# Patient Record
Sex: MALE | Race: WHITE | NOT HISPANIC OR LATINO | Employment: OTHER | URBAN - METROPOLITAN AREA
[De-identification: names, ages, dates, MRNs, and addresses within clinical notes are randomized per-mention and may not be internally consistent; named-entity substitution may affect disease eponyms.]

---

## 2023-09-11 ENCOUNTER — OFFICE VISIT (OUTPATIENT)
Dept: FAMILY MEDICINE CLINIC | Facility: CLINIC | Age: 68
End: 2023-09-11
Payer: MEDICARE

## 2023-09-11 VITALS
BODY MASS INDEX: 35.33 KG/M2 | WEIGHT: 260.8 LBS | HEIGHT: 72 IN | DIASTOLIC BLOOD PRESSURE: 80 MMHG | SYSTOLIC BLOOD PRESSURE: 138 MMHG

## 2023-09-11 DIAGNOSIS — Z12.5 SCREENING PSA (PROSTATE SPECIFIC ANTIGEN): ICD-10-CM

## 2023-09-11 DIAGNOSIS — Z13.0 SCREENING FOR DEFICIENCY ANEMIA: ICD-10-CM

## 2023-09-11 DIAGNOSIS — Z13.29 SCREENING FOR ENDOCRINE DISORDER: ICD-10-CM

## 2023-09-11 DIAGNOSIS — I10 PRIMARY HYPERTENSION: Primary | ICD-10-CM

## 2023-09-11 DIAGNOSIS — M54.41 ACUTE RIGHT-SIDED LOW BACK PAIN WITH RIGHT-SIDED SCIATICA: ICD-10-CM

## 2023-09-11 DIAGNOSIS — N52.9 ERECTILE DYSFUNCTION, UNSPECIFIED ERECTILE DYSFUNCTION TYPE: ICD-10-CM

## 2023-09-11 DIAGNOSIS — F10.90 HEAVY ALCOHOL USE: ICD-10-CM

## 2023-09-11 DIAGNOSIS — Z13.220 SCREENING FOR HYPERLIPIDEMIA: ICD-10-CM

## 2023-09-11 RX ORDER — SILDENAFIL 100 MG/1
100 TABLET, FILM COATED ORAL DAILY PRN
Qty: 15 TABLET | Refills: 0 | Status: SHIPPED | OUTPATIENT
Start: 2023-09-11

## 2023-09-11 RX ORDER — MULTIPLE VITAMINS W/ MINERALS TAB 9MG-400MCG
1 TAB ORAL DAILY
COMMUNITY

## 2023-09-11 NOTE — PROGRESS NOTES
New Patient Office Visit      Patient Name: Blair Rodriguez  : 1955   MRN: [id number]     Chief Complaint:    Chief Complaint   Patient presents with    Back Pain    Leg Pain     New pt wants to discuss sciatic pain    Erectile Dysfunction       Subjective   History of Present Illness    History of Present Illness: Blair Rodriguez is a 68 y.o. male who is here today to establish care.    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 Usually gout flares in feet. Has reduced alcohol intake.  HLD: Previously prescribed atorvastatin, had fatigue and switched away (has felt better since then). Prescribed Zetia 10mg, did not start.  History of depression: Previously on depression medication, stopped a few years ago    Other physicians currently involved in patient's care:  Patient Care Team:  Elbert Ashton DO as PCP - General (Family Medicine)    Acute Concerns:  ~6 months of erectile dysfunction. Unsure if stress-related from moves. Goes to gym 3 days per week, trying to get things back to normal.    Right sided sciatica. Went to massage therapist, they suggested PT.    This patient is accompanied by their self who contributes to the history of their care.    The following portions of the patient's history were reviewed and updated as appropriate: allergies, current medications, past family history, past medical history, past social history, past surgical history and problem list.    Subjective      Review of Systems:   Review of Systems - See HPI and new patient paperwork scanned into chart    Past Medical History:   Past Medical History:   Diagnosis Date    Hypertension     Kidney stone     Low back pain 6 months       Past Surgical History:    Past Surgical History:   Procedure Laterality Date    COLONOSCOPY  4 years    FOOT SURGERY Left 1969       Family History:   Family History   Problem Relation Age of Onset    Alcohol abuse Mother     Cancer Mother        Social History:   Social History     Socioeconomic History    Marital status:    Tobacco Use    Smoking status: Former     Packs/day: 1.00     Years: 15.00     Pack years: 15.00     Types: Cigarettes     Start date: 1975     Quit date: 7/3/1986     Years since quittin.2    Smokeless tobacco: Current     Types: Chew    Tobacco comments:     Still use smokeless tobacco   Vaping Use    Vaping Use: Never used   Substance and Sexual Activity    Alcohol use: Yes     Alcohol/week: 28.0 standard drinks     Types: 28 Drinks containing 0.5 oz of alcohol per week    Drug use: Not Currently     Types: Cocaine(coke), Marijuana    Sexual activity: Yes     Partners: Female       Tobacco History:   Social History     Tobacco Use   Smoking Status Former    Packs/day: 1.00    Years: 15.00    Pack years: 15.00    Types: Cigarettes    Start date: 1975    Quit date: 7/3/1986    Years since quittin.2   Smokeless Tobacco Current    Types: Chew   Tobacco Comments    Still use smokeless tobacco       Medications:     Current Outpatient Medications:     amLODIPine (NORVASC) 5 MG tablet, Take 1 tablet by mouth Daily., Disp: , Rfl:     Glucos-Chond-MSM-Bor-D3-Hyalur (MOVE FREE JOINT HEALTH ADV + D PO), Take 1 tablet/day by mouth Daily., Disp: , Rfl:     Misc Natural Products (TART CHERRY ADVANCED PO), Take 1 tablet by mouth Daily As Needed., Disp: , Rfl:     multivitamin with minerals (One-A-Day Proactive 65+) tablet tablet, Take 1 tablet by mouth Daily., Disp: , Rfl:     olmesartan-hydrochlorothiazide (BENICAR HCT) 40-12.5 MG per tablet, Take 1 tablet by mouth Daily., Disp: , Rfl:     sildenafil (Viagra) 100 MG tablet, Take 1 tablet by mouth Daily As Needed for Erectile Dysfunction., Disp: 15  "tablet, Rfl: 0    Allergies:   No Known Allergies    Objective   Objective     Physical Exam:  Vital Signs:   Vitals:    09/11/23 1257   BP: 138/80   BP Location: Left arm   Patient Position: Sitting   Cuff Size: Adult   Weight: 118 kg (260 lb 12.8 oz)   Height: 182.9 cm (72\")     Body mass index is 35.37 kg/m².     Physical Exam  Nursing note reviewed  Const: NAD, A&Ox4, Pleasant, Cooperative  Eyes: EOMI, no conjunctivitis  ENT: No nasal discharge present, neck supple  Cardiac: Regular rate and rhythm, no cyanosis  Resp: Respiratory rate within normal limits, no increased work of breathing, no audible wheezing or retractions noted  GI: No distention or ascites  MSK: Motor and sensation grossly intact in bilateral upper extremities  Neurologic: CN II-XII grossly intact  Psych: Appropriate mood and behavior.  Skin: Warm, dry  Procedures/Radiology     Procedures  No radiology results for the last 7 days     Assessment & Plan   Assessment / Plan      Assessment/Plan:   Problems Addressed This Visit  Diagnoses and all orders for this visit:    1. Primary hypertension (Primary)  Assessment & Plan:  A little high today, 138/80. Consider magnesium, continue gym and weight loss. Alcohol cessation will also make a big difference.      2. Acute right-sided low back pain with right-sided sciatica  -     Ambulatory Referral to Physical Therapy Evaluate and treat    3. Screening for endocrine disorder  -     TSH Rfx On Abnormal To Free T4; Future  -     Comprehensive Metabolic Panel; Future    4. Screening for hyperlipidemia  -     Lipid Panel; Future    5. Screening for deficiency anemia  -     CBC & Differential; Future    6. Erectile dysfunction, unspecified erectile dysfunction type  -     sildenafil (Viagra) 100 MG tablet; Take 1 tablet by mouth Daily As Needed for Erectile Dysfunction.  Dispense: 15 tablet; Refill: 0  -     Testosterone; Future  -     Prolactin; Future  -     Urinalysis With Microscopic If Indicated (No " Culture) - Urine, Clean Catch; Future  -     Vitamin B12; Future    7. Screening PSA (prostate specific antigen)  -     PSA Screen; Future    8. Heavy alcohol use      Problem List Items Addressed This Visit          Cardiac and Vasculature    Primary hypertension - Primary    Overview     Olmesartan-HCTZ 40-12.5mg, amlodipine 5mg         Current Assessment & Plan     A little high today, 138/80. Consider magnesium, continue gym and weight loss. Alcohol cessation will also make a big difference.            Mental Health    Heavy alcohol use    Overview     5-7 drinks per night, vodka          Other Visit Diagnoses       Acute right-sided low back pain with right-sided sciatica        Relevant Orders    Ambulatory Referral to Physical Therapy Evaluate and treat    Screening for endocrine disorder        Relevant Orders    TSH Rfx On Abnormal To Free T4 (Completed)    Comprehensive Metabolic Panel (Completed)    Screening for hyperlipidemia        Relevant Orders    Lipid Panel (Completed)    Screening for deficiency anemia        Relevant Orders    CBC & Differential (Completed)    Erectile dysfunction, unspecified erectile dysfunction type        Relevant Medications    sildenafil (Viagra) 100 MG tablet    Other Relevant Orders    Testosterone (Completed)    Prolactin (Completed)    Urinalysis With Microscopic If Indicated (No Culture) - Urine, Clean Catch (Completed)    Vitamin B12 (Completed)    Screening PSA (prostate specific antigen)        Relevant Orders    PSA Screen (Completed)            We will plan to obtain previous records both for chronic preventative care as well as those related to the current episode of care.  Any records that the patient brought with him today were reviewed personally by me during the visit today and will be scanned into the chart for posterity.    Discussed the nature of the disease including relevant anatomy & expected clinical course, risks, complications, implications,  management, safe and proper use of medications. Plan of care reviewed with patient at the conclusion of today's visit. Education was provided regarding diagnosis and management.  Patient verbalizes understanding of and agreement with management plan. Encouraged therapeutic lifestyle changes including low calorie diet with plenty of fruits and vegetables, daily exercise, medication compliance, and keeping scheduled follow up appointments with me and any other providers. Encouraged patient to have appointment for complete physical, fasting labs, appropriate screenings, and immunizations on an annual basis. Discussed extended office hours, shared call, and appropriate use of the ER. Discussed generally we do not prescribe chronic controlled substances from this office. Appropriate referrals will be made to pain management and psychiatry if needed. Stressed the importance and expectation of medical compliance with plan of care, medications, and follow up appointments.    Patient Instructions   I would like you to have your labs done about a week prior to your next appointment.  You do not need an appointment, but I would advise to call our office a few days before you plan to have your labs done to confirm that orders are in and active.  We will discuss the results at your next scheduled visit.  -Lab services are available at any [facility] outpatient lab center, including across the street at [address] [place] [address] Dr     Use [employer] coupon (online or in nancy) for Viagra.    Try to bring down to 203 drinks per night    Follow Up:   Return in about 6 weeks (around 10/23/2023) for Medicare Wellness.      DO JACKY Powell RD  [facility]  [address]  [facility] [id number]  Fax [phone]  Phone [phone]

## 2023-09-11 NOTE — ASSESSMENT & PLAN NOTE
A little high today, 138/80. Consider magnesium, continue gym and weight loss. Alcohol cessation will also make a big difference.

## 2023-09-11 NOTE — PATIENT INSTRUCTIONS
I would like you to have your labs done about a week prior to your next appointment.  You do not need an appointment, but I would advise to call our office a few days before you plan to have your labs done to confirm that orders are in and active.  We will discuss the results at your next scheduled visit.  -Lab services are available at any [facility] outpatient lab center, including across the street at [address] [place] [address] Dr     Use [employer] coupon (online or in nancy) for Viagra.    Try to bring down to 203 drinks per night

## 2023-09-12 ENCOUNTER — LAB (OUTPATIENT)
Dept: LAB | Facility: HOSPITAL | Age: 68
End: 2023-09-12
Payer: MEDICARE

## 2023-09-12 DIAGNOSIS — N52.9 ERECTILE DYSFUNCTION, UNSPECIFIED ERECTILE DYSFUNCTION TYPE: ICD-10-CM

## 2023-09-12 DIAGNOSIS — Z13.220 SCREENING FOR HYPERLIPIDEMIA: ICD-10-CM

## 2023-09-12 DIAGNOSIS — Z13.0 SCREENING FOR DEFICIENCY ANEMIA: ICD-10-CM

## 2023-09-12 DIAGNOSIS — Z13.29 SCREENING FOR ENDOCRINE DISORDER: ICD-10-CM

## 2023-09-12 DIAGNOSIS — Z12.5 SCREENING PSA (PROSTATE SPECIFIC ANTIGEN): ICD-10-CM

## 2023-09-12 LAB
ALBUMIN SERPL-MCNC: 4.5 G/DL (ref 3.5–5.2)
ALBUMIN/GLOB SERPL: 1.7 G/DL
ALP SERPL-CCNC: 78 U/L (ref 39–117)
ALT SERPL W P-5'-P-CCNC: 115 U/L (ref 1–41)
ANION GAP SERPL CALCULATED.3IONS-SCNC: 15.3 MMOL/L (ref 5–15)
AST SERPL-CCNC: 100 U/L (ref 1–40)
BACTERIA UR QL AUTO: ABNORMAL /HPF
BASOPHILS # BLD AUTO: 0.05 10*3/MM3 (ref 0–0.2)
BASOPHILS NFR BLD AUTO: 0.8 % (ref 0–1.5)
BILIRUB SERPL-MCNC: 1 MG/DL (ref 0–1.2)
BILIRUB UR QL STRIP: NEGATIVE
BUN SERPL-MCNC: 8 MG/DL (ref 8–23)
BUN/CREAT SERPL: 8.3 (ref 7–25)
CALCIUM SPEC-SCNC: 9.8 MG/DL (ref 8.6–10.5)
CHLORIDE SERPL-SCNC: 102 MMOL/L (ref 98–107)
CHOLEST SERPL-MCNC: 252 MG/DL (ref 0–200)
CLARITY UR: ABNORMAL
CO2 SERPL-SCNC: 26.7 MMOL/L (ref 22–29)
COLOR UR: ABNORMAL
CREAT SERPL-MCNC: 0.96 MG/DL (ref 0.76–1.27)
DEPRECATED RDW RBC AUTO: 40.6 FL (ref 37–54)
EGFRCR SERPLBLD CKD-EPI 2021: 86.6 ML/MIN/1.73
EOSINOPHIL # BLD AUTO: 0.23 10*3/MM3 (ref 0–0.4)
EOSINOPHIL NFR BLD AUTO: 3.7 % (ref 0.3–6.2)
ERYTHROCYTE [DISTWIDTH] IN BLOOD BY AUTOMATED COUNT: 11.8 % (ref 12.3–15.4)
GLOBULIN UR ELPH-MCNC: 2.6 GM/DL
GLUCOSE SERPL-MCNC: 120 MG/DL (ref 65–99)
GLUCOSE UR STRIP-MCNC: NEGATIVE MG/DL
HCT VFR BLD AUTO: 49.9 % (ref 37.5–51)
HDLC SERPL-MCNC: 44 MG/DL (ref 40–60)
HGB BLD-MCNC: 17.4 G/DL (ref 13–17.7)
HGB UR QL STRIP.AUTO: ABNORMAL
HYALINE CASTS UR QL AUTO: ABNORMAL /LPF
IMM GRANULOCYTES # BLD AUTO: 0.02 10*3/MM3 (ref 0–0.05)
IMM GRANULOCYTES NFR BLD AUTO: 0.3 % (ref 0–0.5)
KETONES UR QL STRIP: ABNORMAL
LDLC SERPL CALC-MCNC: 174 MG/DL (ref 0–100)
LDLC/HDLC SERPL: 3.9 {RATIO}
LEUKOCYTE ESTERASE UR QL STRIP.AUTO: ABNORMAL
LYMPHOCYTES # BLD AUTO: 2.83 10*3/MM3 (ref 0.7–3.1)
LYMPHOCYTES NFR BLD AUTO: 45.9 % (ref 19.6–45.3)
MCH RBC QN AUTO: 32.8 PG (ref 26.6–33)
MCHC RBC AUTO-ENTMCNC: 34.9 G/DL (ref 31.5–35.7)
MCV RBC AUTO: 94 FL (ref 79–97)
MONOCYTES # BLD AUTO: 0.61 10*3/MM3 (ref 0.1–0.9)
MONOCYTES NFR BLD AUTO: 9.9 % (ref 5–12)
NEUTROPHILS NFR BLD AUTO: 2.42 10*3/MM3 (ref 1.7–7)
NEUTROPHILS NFR BLD AUTO: 39.4 % (ref 42.7–76)
NITRITE UR QL STRIP: NEGATIVE
NRBC BLD AUTO-RTO: 0 /100 WBC (ref 0–0.2)
PH UR STRIP.AUTO: 6 [PH] (ref 5–8)
PLATELET # BLD AUTO: 85 10*3/MM3 (ref 140–450)
PMV BLD AUTO: 10.7 FL (ref 6–12)
POTASSIUM SERPL-SCNC: 4 MMOL/L (ref 3.5–5.2)
PROLACTIN SERPL-MCNC: 13.5 NG/ML (ref 4.04–15.2)
PROT SERPL-MCNC: 7.1 G/DL (ref 6–8.5)
PROT UR QL STRIP: ABNORMAL
PSA SERPL-MCNC: 0.94 NG/ML (ref 0–4)
RBC # BLD AUTO: 5.31 10*6/MM3 (ref 4.14–5.8)
RBC # UR STRIP: ABNORMAL /HPF
REF LAB TEST METHOD: ABNORMAL
SODIUM SERPL-SCNC: 144 MMOL/L (ref 136–145)
SP GR UR STRIP: 1.02 (ref 1–1.03)
SQUAMOUS #/AREA URNS HPF: ABNORMAL /HPF
TESTOST SERPL-MCNC: 421 NG/DL (ref 193–740)
TRIGL SERPL-MCNC: 181 MG/DL (ref 0–150)
TSH SERPL DL<=0.05 MIU/L-ACNC: 2.2 UIU/ML (ref 0.27–4.2)
UROBILINOGEN UR QL STRIP: ABNORMAL
VIT B12 BLD-MCNC: 563 PG/ML (ref 211–946)
VLDLC SERPL-MCNC: 34 MG/DL (ref 5–40)
WBC # UR STRIP: ABNORMAL /HPF
WBC NRBC COR # BLD: 6.16 10*3/MM3 (ref 3.4–10.8)

## 2023-09-12 PROCEDURE — 80053 COMPREHEN METABOLIC PANEL: CPT

## 2023-09-12 PROCEDURE — 84403 ASSAY OF TOTAL TESTOSTERONE: CPT

## 2023-09-12 PROCEDURE — 84443 ASSAY THYROID STIM HORMONE: CPT

## 2023-09-12 PROCEDURE — G0103 PSA SCREENING: HCPCS

## 2023-09-12 PROCEDURE — 81001 URINALYSIS AUTO W/SCOPE: CPT

## 2023-09-12 PROCEDURE — 82607 VITAMIN B-12: CPT

## 2023-09-12 PROCEDURE — 80061 LIPID PANEL: CPT

## 2023-09-12 PROCEDURE — 84146 ASSAY OF PROLACTIN: CPT

## 2023-09-12 PROCEDURE — 85025 COMPLETE CBC W/AUTO DIFF WBC: CPT

## 2023-09-14 ENCOUNTER — PATIENT ROUNDING (BHMG ONLY) (OUTPATIENT)
Dept: FAMILY MEDICINE CLINIC | Facility: CLINIC | Age: 68
End: 2023-09-14
Payer: MEDICARE

## 2023-10-10 ENCOUNTER — TELEPHONE (OUTPATIENT)
Dept: FAMILY MEDICINE CLINIC | Facility: CLINIC | Age: 68
End: 2023-10-10
Payer: MEDICARE

## 2023-10-10 NOTE — TELEPHONE ENCOUNTER
Caller: Blair Rodriguez    Relationship: Self    Best call back number: [phone]     What medication are you requesting: PATIENT IS GOING HUNTING IN [place].  THERE IS A MEDICATION THAT HELPS ACCLIMATE BREATHING ISSUES.  HE WOULD LIKE THIS MEDICATION.      If a prescription is needed, what is your preferred pharmacy and phone number:  [facility]     Additional notes:

## 2023-10-11 NOTE — TELEPHONE ENCOUNTER
Patient stated he is not leaving until after his scheduled appointment with Dr. Ashton at the end of the month. He will discuss it at that time.

## 2023-10-24 ENCOUNTER — LAB (OUTPATIENT)
Dept: LAB | Facility: HOSPITAL | Age: 68
End: 2023-10-24
Payer: MEDICARE

## 2023-10-24 ENCOUNTER — OFFICE VISIT (OUTPATIENT)
Dept: FAMILY MEDICINE CLINIC | Facility: CLINIC | Age: 68
End: 2023-10-24
Payer: MEDICARE

## 2023-10-24 VITALS
WEIGHT: 262 LBS | BODY MASS INDEX: 35.49 KG/M2 | SYSTOLIC BLOOD PRESSURE: 118 MMHG | HEIGHT: 72 IN | HEART RATE: 86 BPM | OXYGEN SATURATION: 98 % | DIASTOLIC BLOOD PRESSURE: 86 MMHG

## 2023-10-24 DIAGNOSIS — M10.271 ACUTE DRUG-INDUCED GOUT OF RIGHT FOOT: ICD-10-CM

## 2023-10-24 DIAGNOSIS — F41.1 GAD (GENERALIZED ANXIETY DISORDER): ICD-10-CM

## 2023-10-24 DIAGNOSIS — F10.90 HEAVY ALCOHOL USE: ICD-10-CM

## 2023-10-24 DIAGNOSIS — Z00.00 MEDICARE ANNUAL WELLNESS VISIT, SUBSEQUENT: Primary | ICD-10-CM

## 2023-10-24 DIAGNOSIS — E78.5 HYPERLIPIDEMIA LDL GOAL <100: ICD-10-CM

## 2023-10-24 DIAGNOSIS — I10 PRIMARY HYPERTENSION: ICD-10-CM

## 2023-10-24 DIAGNOSIS — M10.9 ACUTE GOUT OF RIGHT FOOT, UNSPECIFIED CAUSE: ICD-10-CM

## 2023-10-24 DIAGNOSIS — Z29.89 ALTITUDE SICKNESS PREVENTATIVE MEASURES: ICD-10-CM

## 2023-10-24 LAB — URATE SERPL-MCNC: 10.1 MG/DL (ref 3.4–7)

## 2023-10-24 PROCEDURE — 3079F DIAST BP 80-89 MM HG: CPT | Performed by: FAMILY MEDICINE

## 2023-10-24 PROCEDURE — 3074F SYST BP LT 130 MM HG: CPT | Performed by: FAMILY MEDICINE

## 2023-10-24 PROCEDURE — G0439 PPPS, SUBSEQ VISIT: HCPCS | Performed by: FAMILY MEDICINE

## 2023-10-24 PROCEDURE — 84550 ASSAY OF BLOOD/URIC ACID: CPT

## 2023-10-24 RX ORDER — OLMESARTAN MEDOXOMIL 40 MG/1
40 TABLET ORAL DAILY
Qty: 90 TABLET | Refills: 1 | Status: SHIPPED | OUTPATIENT
Start: 2023-10-24

## 2023-10-24 RX ORDER — DIAZEPAM 2 MG/1
TABLET ORAL
Qty: 67 TABLET | Refills: 0 | Status: SHIPPED | OUTPATIENT
Start: 2023-10-24 | End: 2023-11-22

## 2023-10-24 RX ORDER — ACETAZOLAMIDE 125 MG/1
125 TABLET ORAL 2 TIMES DAILY
Qty: 2 TABLET | Refills: 0 | Status: SHIPPED | OUTPATIENT
Start: 2023-10-24 | End: 2023-10-25

## 2023-10-24 RX ORDER — PREDNISONE 20 MG/1
TABLET ORAL
Qty: 12 TABLET | Refills: 0 | Status: SHIPPED | OUTPATIENT
Start: 2023-10-24 | End: 2023-10-29

## 2023-10-24 RX ORDER — OLMESARTAN MEDOXOMIL AND HYDROCHLOROTHIAZIDE 40/12.5 40; 12.5 MG/1; MG/1
1 TABLET ORAL DAILY
Qty: 90 TABLET | Refills: 1 | Status: SHIPPED | OUTPATIENT
Start: 2023-10-24 | End: 2023-10-24

## 2023-10-24 RX ORDER — FLUOXETINE HYDROCHLORIDE 20 MG/1
20 CAPSULE ORAL DAILY
Qty: 30 CAPSULE | Refills: 2 | Status: SHIPPED | OUTPATIENT
Start: 2023-10-24

## 2023-10-24 RX ORDER — AMLODIPINE BESYLATE 5 MG/1
5 TABLET ORAL DAILY
Qty: 90 TABLET | Refills: 1 | Status: SHIPPED | OUTPATIENT
Start: 2023-10-24

## 2023-10-24 NOTE — PROGRESS NOTES
The ABCs of the Annual Wellness Visit  Subsequent Medicare Wellness Visit    Subjective      Blair Rodriguez is a 68 y.o. male who presents for a Subsequent Medicare Wellness Visit.    The following portions of the patient's history were reviewed and   updated as appropriate: allergies, current medications, past family history, past medical history, past social history, past surgical history, and problem list.    Chronic health conditions:  HTN: Amlodipine 5mg daily for years, recently added olmesartan-HCTZ 40-12.5mg  Gout: Takes tart cherry supplement 10,500mg. Usually gout flares in feet. Has reduced alcohol intake in the last few years, but still at 6 liquor shot equivalents per day. Had recent gout flares in both feet, pain has been persisting for a few weeks. Upped his tart cherry supp to 2 pills per day.  HLD: Previously prescribed atorvastatin, had fatigue and switched away (has felt better since then). Prescribed Zetia 10mg, did not start.  History of depression: Previously on depression medication, stopped a few years ago    Compared to one year ago, the patient feels his physical   health is the same.    Compared to one year ago, the patient feels his mental   health is the same.    Recent Hospitalizations:  He was not admitted to the hospital during the last year.       Current Medical Providers:  Patient Care Team:  Elbert Ashton, DO as PCP - General (Family Medicine)    Outpatient Medications Prior to Visit   Medication Sig Dispense Refill    Glucos-Chond-MSM-Bor-D3-Hyalur (MOVE FREE JOINT HEALTH ADV + D PO) Take 1 tablet/day by mouth Daily.      Misc Natural Products (TART CHERRY ADVANCED PO) Take 1 tablet by mouth Daily As Needed.      multivitamin with minerals (One-A-Day Proactive 65+) tablet tablet Take 1 tablet by mouth Daily.      sildenafil (Viagra) 100 MG tablet Take 1 tablet by mouth Daily As Needed for Erectile Dysfunction. 15 tablet 0    amLODIPine (NORVASC) 5 MG tablet Take 1 tablet by  "mouth Daily.      olmesartan-hydrochlorothiazide (BENICAR HCT) 40-12.5 MG per tablet Take 1 tablet by mouth Daily.       No facility-administered medications prior to visit.       No opioid medication identified on active medication list. I have reviewed chart for other potential  high risk medication/s and harmful drug interactions in the elderly.        Aspirin is not on active medication list.  Aspirin use is not indicated based on review of current medical condition/s. Risk of harm outweighs potential benefits.  .    Patient Active Problem List   Diagnosis    Primary hypertension    Heavy alcohol use    Hyperlipidemia LDL goal <100    UVALDO (generalized anxiety disorder)    Acute drug-induced gout of right foot     Advance Care Planning   Advance Care Planning     Advance Directive is not on file.  ACP discussion was held with the patient during this visit. Patient does not have an advance directive, information provided.     Objective    Vitals:    10/24/23 0903   BP: 118/86   BP Location: Left arm   Patient Position: Sitting   Cuff Size: Adult   Pulse: 86   SpO2: 98%   Weight: 119 kg (262 lb)   Height: 182.9 cm (72\")     Estimated body mass index is 35.53 kg/m² as calculated from the following:    Height as of this encounter: 182.9 cm (72\").    Weight as of this encounter: 119 kg (262 lb).           Does the patient have evidence of cognitive impairment?   Yes: Labs Ordered.   ATTENTION  What is the year: correct  What is the month of the year: correct  What is the day of the week?: correct  What is the date?: correct  MEMORY  Repeat address three times, only score third attempt: [address] [place], [place]: 5  HOW MANY ANIMALS DID THE PATIENT NAME  Verbal Fluency -- Animal Names (0-25): 22+  CLOCK DRAWING  Clock Drawing: All Correct  MEMORY RECALL  Tell me what you remember about that name and address we were repeating at the beginnin  ACE TOTAL SCORE  Total ACE Score - <25/30 strongly " suggests cognitive impairment; <21/30 almost certainly shows dementia: 21     Lab Results   Component Value Date    TRIG 181 (H) 2023    HDL 44 2023     (H) 2023    VLDL 34 2023          HEALTH RISK ASSESSMENT    Smoking Status:  Social History     Tobacco Use   Smoking Status Former    Packs/day: 1.00    Years: 15.00    Additional pack years: 0.00    Total pack years: 15.00    Types: Cigarettes    Start date: 1975    Quit date: 7/3/1986    Years since quittin.3   Smokeless Tobacco Current    Types: Chew   Tobacco Comments    Still use smokeless tobacco     Alcohol Consumption:  Social History     Substance and Sexual Activity   Alcohol Use Yes    Alcohol/week: 28.0 standard drinks of alcohol    Types: 28 Drinks containing 0.5 oz of alcohol per week     Fall Risk Screen:    ANTOINE Fall Risk Assessment was completed, and patient is at LOW risk for falls.Assessment completed on:10/24/2023    Depression Screening:      10/24/2023     9:10 AM   PHQ-2/PHQ-9 Depression Screening   Little Interest or Pleasure in Doing Things 0-->not at all   Feeling Down, Depressed or Hopeless 0-->not at all   PHQ-9: Brief Depression Severity Measure Score 0       Health Habits and Functional and Cognitive Screening:      10/24/2023     9:07 AM   Functional & Cognitive Status   Do you have difficulty preparing food and eating? No   Do you have difficulty bathing yourself, getting dressed or grooming yourself? No   Do you have difficulty using the toilet? No   Do you have difficulty moving around from place to place? No   Do you have trouble with steps or getting out of a bed or a chair? No   Current Diet Well Balanced Diet   Dental Exam Up to date   Eye Exam Up to date   Exercise (times per week) 3 times per week   Current Exercises Include Light Weights;Walking   Do you need help using the phone?  No   Are you deaf or do you have serious difficulty hearing?  No   Do you need help to go to places  out of walking distance? No   Do you need help shopping? No   Do you need help preparing meals?  No   Do you need help with housework?  No   Do you need help with laundry? No   Do you need help taking your medications? No   Do you need help managing money? No   Do you ever drive or ride in a car without wearing a seat belt? No   Have you felt unusual stress, anger or loneliness in the last month? No   Who do you live with? Spouse   If you need help, do you have trouble finding someone available to you? No   Have you been bothered in the last four weeks by sexual problems? No   Do you have difficulty concentrating, remembering or making decisions? No       Age-appropriate Screening Schedule:  Refer to the list below for future screening recommendations based on patient's age, sex and/or medical conditions. Orders for these recommended tests are listed in the plan section. The patient has been provided with a written plan.    Health Maintenance   Topic Date Due    ZOSTER VACCINE (2 of 3) 10/25/2016    HEPATITIS C SCREENING  Never done    Pneumococcal Vaccine 65+ (2 - PCV) 09/11/2024 (Originally 5/26/2022)    COVID-19 Vaccine (4 - 2023-24 season) 12/01/2024 (Originally 9/1/2023)    BMI FOLLOWUP  09/11/2024    LIPID PANEL  09/12/2024    ANNUAL WELLNESS VISIT  10/24/2024    COLORECTAL CANCER SCREENING  06/24/2029    TDAP/TD VACCINES (3 - Td or Tdap) 03/02/2033    INFLUENZA VACCINE  Completed    AAA SCREEN (ONE-TIME)  Completed                  CMS Preventative Services Quick Reference  Risk Factors Identified During Encounter:    None Identified    The above risks/problems have been discussed with the patient.  Pertinent information has been shared with the patient in the After Visit Summary.    Diagnoses and all orders for this visit:    1. Medicare annual wellness visit, subsequent (Primary)    2. Primary hypertension  Assessment & Plan:  Change to omesartan 40mg without HCTZ, continue amlodipine 5mg, quit alcohol.  Check uric acid.    Orders:  -     amLODIPine (NORVASC) 5 MG tablet; Take 1 tablet by mouth Daily.  Dispense: 90 tablet; Refill: 1  -     olmesartan (BENICAR) 40 MG tablet; Take 1 tablet by mouth Daily.  Dispense: 90 tablet; Refill: 1    3. Acute gout of right foot, unspecified cause  -     predniSONE (DELTASONE) 20 MG tablet; Take 3 tablets by mouth Daily for 2 days, THEN 2 tablets Daily for 2 days, THEN 1 tablet Daily for 2 days.  Dispense: 12 tablet; Refill: 0  -     Uric Acid; Future    4. Altitude sickness preventative measures  -     acetaZOLAMIDE (DIAMOX) 125 MG tablet; Take 1 tablet by mouth 2 (Two) Times a Day for 1 day. Start 24 hours before ascending to altitude.  Dispense: 2 tablet; Refill: 0    5. Hyperlipidemia LDL goal <100  Assessment & Plan:  Lab Results   Component Value Date    CHOL 252 (H) 09/12/2023    TRIG 181 (H) 09/12/2023    HDL 44 09/12/2023     (H) 09/12/2023   Recommend alcohol cessation, starting cholesterol medication in 6 months if not improved      6. UVALDO (generalized anxiety disorder)  Assessment & Plan:  Start SSRI and BZ (to assist with alcohol cessation and withdrawal)    Orders:  -     FLUoxetine (PROzac) 20 MG capsule; Take 1 capsule by mouth Daily.  Dispense: 30 capsule; Refill: 2    7. Acute drug-induced gout of right foot  Assessment & Plan:  Recommend check uric acid, alcohol cessation, stopping HCTZ      8. Heavy alcohol use  Assessment & Plan:  Recommend reduction    Orders:  -     diazePAM (VALIUM) 2 MG tablet; Take 3 tablets by mouth Daily for 7 days, THEN 2 tablets 2 (Two) Times a Day for 7 days, THEN 1 tablet 2 (Two) Times a Day for 7 days, THEN 0.5 tablets Daily for 8 days.  Dispense: 67 tablet; Refill: 0    Other orders  -     Discontinue: olmesartan-hydrochlorothiazide (BENICAR HCT) 40-12.5 MG per tablet; Take 1 tablet by mouth Daily.  Dispense: 90 tablet; Refill: 1      Problem List Items Addressed This Visit          Cardiac and Vasculature    Primary  hypertension    Current Assessment & Plan     Change to omesartan 40mg without HCTZ, continue amlodipine 5mg, quit alcohol. Check uric acid.         Relevant Medications    amLODIPine (NORVASC) 5 MG tablet    olmesartan (BENICAR) 40 MG tablet    Hyperlipidemia LDL goal <100    Current Assessment & Plan     Lab Results   Component Value Date    CHOL 252 (H) 09/12/2023    TRIG 181 (H) 09/12/2023    HDL 44 09/12/2023     (H) 09/12/2023   Recommend alcohol cessation, starting cholesterol medication in 6 months if not improved            Mental Health    Heavy alcohol use    Overview     5-7 drinks per night, vodka         Current Assessment & Plan     Recommend reduction         Relevant Medications    diazePAM (VALIUM) 2 MG tablet    UVALDO (generalized anxiety disorder)    Overview     Self-medicated for years with alcohol (6 drinks per night)         Current Assessment & Plan     Start SSRI and BZ (to assist with alcohol cessation and withdrawal)         Relevant Medications    FLUoxetine (PROzac) 20 MG capsule    diazePAM (VALIUM) 2 MG tablet       Musculoskeletal and Injuries    Acute drug-induced gout of right foot    Current Assessment & Plan     Recommend check uric acid, alcohol cessation, stopping HCTZ          Other Visit Diagnoses       Medicare annual wellness visit, subsequent    -  Primary    Acute gout of right foot, unspecified cause        Relevant Orders    Uric Acid (Completed)    Altitude sickness preventative measures              The wellness exam has been reviewed in detail.  The patient has been fully counseled on preventative guidelines for vaccines, cancer screenings, and other health maintenance needs.  Functional testing has been performed to assess capacity for independent living and need for other medical interventions.   The patient was counseled on maintaining a lifestyle to promote good health and to minimize chronic diseases.  The patient has been assisted with scheduling  healthcare procedures for the coming year and given a written document outlining these recommendations.      Follow Up:   Next Medicare Wellness visit to be scheduled in 1 year.      An After Visit Summary and PPPS were made available to the patient.

## 2023-10-24 NOTE — ASSESSMENT & PLAN NOTE
Lab Results   Component Value Date    CHOL 252 (H) 09/12/2023    TRIG 181 (H) 09/12/2023    HDL 44 09/12/2023     (H) 09/12/2023   Recommend alcohol cessation, starting cholesterol medication in 6 months if not improved

## 2023-10-24 NOTE — PATIENT INSTRUCTIONS
Advance Care Planning and Advance Directives     You make decisions on a daily basis - decisions about where you want to live, your career, your home, your life. Perhaps one of the most important decisions you face is your choice for future medical care. Take time to talk with your family and your healthcare team and start planning today.  Advance Care Planning is a process that can help you:  Understand possible future healthcare decisions in light of your own experiences  Reflect on those decision in light of your goals and values  Discuss your decisions with those closest to you and the healthcare professionals that care for you  Make a plan by creating a document that reflects your wishes    Surrogate Decision Maker  In the event of a medical emergency, which has left you unable to communicate or to make your own decisions, you would need someone to make decisions for you.  It is important to discuss your preferences for medical treatment with this person while you are in good health.     Qualities of a surrogate decision maker:  Willing to take on this role and responsibility  Knows what you want for future medical care  Willing to follow your wishes even if they don't agree with them  Able to make difficult medical decisions under stressful circumstances    Advance Directives  These are legal documents you can create that will guide your healthcare team and decision maker(s) when needed. These documents can be stored in the electronic medical record.    Living Will - a legal document to guide your care if you have a terminal condition or a serious illness and are unable to communicate. States vary by statute in document names/types, but most forms may include one or more of the following:        -  Directions regarding life-prolonging treatments        -  Directions regarding artificially provided nutrition/hydration        -  Choosing a healthcare decision maker        -  Direction regarding organ/tissue  donation    Durable Power of  for Healthcare - this document names an -in-fact to make medical decisions for you, but it may also allow this person to make personal and financial decisions for you. Please seek the advice of an  if you need this type of document.    **Advance Directives are not required and no one may discriminate against you if you do not sign one.    Medical Orders  Many states allow specific forms/orders signed by your physician to record your wishes for medical treatment in your current state of health. This form, signed in personal communication with your physician, addresses resuscitation and other medical interventions that you may or may not want.      For more information or to schedule a time with a [facility] Advance Care Planning Facilitator contact: [facility] or call [phone] and someone will contact you directly.  You are due for Shingrix vaccination series ( the newest shingles vaccine).  It is a two shot series spaced 2-6 months apart. Please get this vaccine series started at your earliest convenience at your local pharmacy to help avoid shingles outbreak. It is more effective than the old Zostavax vaccine and is recommended even if you have had the Zostavax vaccine in the past.  Once the Shingrix series is completed, it does not need to be repeated.   For more information, please look at the website below:  [facility] Shingrix Vaccine Information      Medicare Wellness  Personal Prevention Plan of Service     Date of Office Visit:    Encounter Provider:  Elbert Ashton DO  Place of Service:  [facility]  Patient Name: Blair Rodriguez  :  1955    As part of the Medicare Wellness portion of your visit today, we are providing you with this personalized preventive plan of services (PPPS). This plan is based upon recommendations of the [place] Preventive Services Task Force (USPSTF) and the Advisory  Committee on Immunization Practices (ACIP).    This lists the preventive care services that should be considered, and provides dates of when you are due. Items listed as completed are up-to-date and do not require any further intervention.    Health Maintenance   Topic Date Due   • ZOSTER VACCINE (2 of 3) 10/25/2016   • HEPATITIS C SCREENING  Never done   • Pneumococcal Vaccine 65+ (2 - PCV) 09/11/2024 (Originally 5/26/2022)   • COVID-19 Vaccine (4 - 2023-24 season) 12/01/2024 (Originally 9/1/2023)   • BMI FOLLOWUP  09/11/2024   • LIPID PANEL  09/12/2024   • ANNUAL WELLNESS VISIT  10/24/2024   • COLORECTAL CANCER SCREENING  06/24/2029   • TDAP/TD VACCINES (3 - Td or Tdap) 03/02/2033   • INFLUENZA VACCINE  Completed   • AAA SCREEN (ONE-TIME)  Completed       Orders Placed This Encounter   Procedures   • Uric Acid     Standing Status:   Future     Standing Expiration Date:   10/24/2024     Order Specific Question:   Release to patient     Answer:   Routine Release [[id number]]       Return in about 6 weeks (around 12/5/2023) for anxiety, alcohol cessation.

## 2023-10-25 ENCOUNTER — TELEPHONE (OUTPATIENT)
Dept: FAMILY MEDICINE CLINIC | Facility: CLINIC | Age: 68
End: 2023-10-25
Payer: MEDICARE

## 2023-10-25 NOTE — TELEPHONE ENCOUNTER
Caller: Blair Rodriguez    Relationship: Self    Best call back number: [phone]    What is the best time to reach you: ANYTIME     Who are you requesting to speak with (clinical staff, provider,  specific staff member): CLINICAL STAFF    What was the call regarding: PATIENT STATES THAT WHEN HE WAS SEEN HE WAS TAKEN OFF OF [facility]. HOWEVER, WHEN HE WENT TO THE PHARMACY THEY GAVE HIM A FILLED PRESCRIPTION OF THIS. HE WANTS TO MAKE SURE IF HE IS TO TAKE THIS ANYMORE.     Is it okay if the provider responds through MyChart: YES

## 2023-10-26 ENCOUNTER — PATIENT MESSAGE (OUTPATIENT)
Dept: FAMILY MEDICINE CLINIC | Facility: CLINIC | Age: 68
End: 2023-10-26
Payer: MEDICARE

## 2024-03-09 ENCOUNTER — E-VISIT (OUTPATIENT)
Dept: FAMILY MEDICINE CLINIC | Facility: TELEHEALTH | Age: 69
End: 2024-03-09

## 2024-03-09 PROCEDURE — BRIGHTMDVISIT: Performed by: NURSE PRACTITIONER

## 2024-03-09 NOTE — EXTERNAL PATIENT INSTRUCTIONS
Note   If symptoms do not improve in 5 days, follow up at urgent care or with your primary care provider.   Diagnosis   Gout, right ankle   My name is DARREL Hayden. I'm a healthcare provider at [facility]. Based on your interview, it looks like you're having a gout flare in your right ankle.   Fortunately, there are effective treatments available to manage gout flares. In the Other treatment section below, you'll find some tips to help you feel better.   Medications   Your pharmacy   [facility] [id number] [address] [facility] [address] [phone]     Prescription   Medrol (4mg): Take this medication as directed on medication packaging. Take with food.   About your diagnosis   Gout is a common form of arthritis that causes redness, pain, and swelling in the joints. It's most common in one or both of the big toes, but it can affect the ankles, elbows, feet, fingers, knees, and wrists.   Gout is caused by too much uric acid in the body. Normally, uric acid dissolves in the blood, passes through the kidneys, and is released from the body through urine. In people who have gout, uric acid forms crystals that build up in different parts of the body. Alcohol intake, poor diet, stress, and other health problems can trigger an attack.   In people already diagnosed with gout, some factors can increase the risk of repeated gout flares, such as:    Dehydration    Drinking large amounts of alcohol    Drinking beverages containing high fructose corn syrup    Eating large amounts of meat or seafood    Fasting    Injury or recent surgery    Overeating    Taking medications that cause sudden changes in blood urate levels   What to expect   If you follow this treatment plan, you should feel better within a few days. If you're not feeling better within 5 days, contact us to adjust your treatment plan.   When to seek care   Call us at [phone]   with any sudden or unexpected symptoms.   When  gout attacks become more frequent, they can last longer and affect more joints. If left untreated, gout attacks can lead to tophi. Tophi are uric acid crystals that form lumps under the skin. Tophi can form on the elbows, fingers, hands, and toes. They cause chronic pain and can destroy the bones around the joints.   Uric acid crystals that collect in the urinary tract can also lead to kidney stones or kidney disease.   Other treatment    During a flare, avoid alcohol completely.    Drink extra water to flush uric acid out of your body.    Elevate your affected joints to help reduce swelling.    Keep clothes and bedding off affected joints to avoid irritation.    Limit the amount of animal protein you eat.    Try to avoid using the affected joints, and rest in bed.   Prevention    Limit alcohol and drinks containing high fructose corn syrup.    Drink plenty of water to stay hydrated. Water helps your body get rid of uric acid.    Eat a diet low in fat, salt, and purines. Purines are compounds found in some foods. When your body digests purine, it produces uric acid. To find out more about a low-purine diet, visit the American Academy of Family Physicians (AAFP) website: https://familydoctor.org/low-purine-diet/  .    Try to maintain a healthy body weight.   Your provider   Your diagnosis was provided by DARREL Hayden, a member of your trusted care team at [facility].   If you have any questions, call us at [phone]  .

## 2024-03-09 NOTE — E-VISIT TREATED
Chief Complaint: Gout   Patient introduction   Patient is 68-year-old male with a previous gout diagnosis presenting with severe joint pain and joint swelling.   Joints affected by gout include:    Right ankle during current flare    Right ankle at initial diagnosis   Regarding an additional location of symptoms, patient writes: Left ankle .   Initial gout diagnosis was made by a primary care provider and included imaging.   General presentation   Symptoms began within the last day and came on suddenly. Including the current flare, patient has had 2 flares in the past year. Current symptoms feel similar to past gout flares. Patient is not aware of any triggers for current or past gout flares.    No recent injury or trauma to affected joints    No recent surgery, medical procedure, or dental procedure   No fever, chills, or malaise.   Patient has history of hypertension.   Medications tried for current symptoms:   The following treatments were not helpful for current symptoms:    Diclofenac    Ibuprofen   Patient's last gout flare was 3 to 6 months ago.   Medications tried for past gout flares:   The following treatments were helpful for past gout flares:    Oral steroids   Current medications   Currently taking amLODIPine 5 MG tablet and olmesartan 40 MG tablet.   Medication allergies   None.   Medication contraindication review   No history of aspirin triad, NSAID-induced asthma/urticaria, cerebral malaria, or systemic fungal infection.   Past medical history   Immune conditions: No immunocompromising conditions.   No history of cancer.   Patient-submitted comments   Patient was asked if they had anything to add about their symptoms. Patient writes: Unable to bear weight on right ankle .   Patient did not request an excuse note.   Patient chose not to upload photos of the affected joint.   Assessment   Gout, right ankle.   This is the likely diagnosis based on patient-submitted interview responses.   Plan    Medications:    Medrol (Willie) 4 mg tablets in a dose pack RX 4mg as directed PO qd 6d Take with food. Take as directed on medication packaging. Amount is 1 packet.   The patient's prescription will be sent to:   [facility] [id number]   [address] [facility] [address]   Phone: [phone]     Fax: [phone]   Education:    Condition and causes    Prevention    Treatment and self-care    When to call provider   Additional Notes   history of gout in right foot noted in patient's chart   ----------   Electronically signed by DARREL Hayden on 2024-03-09 at 10:54AM   ----------   Patient Interview Transcript:   This interview is for patients who've been diagnosed with gout and had gout flares in the past. If you haven't been diagnosed with gout in the past, you should speak with a provider to get care. Have you been diagnosed with gout? Select one.    Yes   Not selected:    No, not that I know of   Who diagnosed you with gout? Select one.    My primary care provider   Not selected:    An urgent care or emergency room provider    A convenient care (walk-in clinic) provider    A specialist, such as a rheumatologist, orthopedist, or sports medicine provider    Other (specify)   Which of these were done as part of the diagnosis? Select all that apply.    Imaging tests, such as x-ray or ultrasound   Not selected:    Blood test    Joint tap, where a needle is used to draw fluid from inside the joint    None of the above   Which of these symptoms are you having in the affected joints? Select all that apply.    Severe pain    Swelling   Not selected:    Redness    Warmth    Not able to use the joint    Other (specify)   When did your current symptoms start? Select one.    Within the last day   Not selected:    1 to 2 days ago    3 to 5 days ago    5 to 7 days ago    More than 7 days ago   Did your current symptoms come on suddenly or gradually? By suddenly, we mean that your symptoms reached their  worst within the first 24 hours. Select one.    Suddenly   Not selected:    Gradually   Right ankle    Original diagnosis    Current flare   Not selected:    Past flares    Last flare   Have any other joints been affected by gout? Tell us which ones, and whether they were affected: - When you were first diagnosed - During past flares - During your last flare - During your current flare If no other joints have been affected, click Next.    __Left ankle __   Including this current flare, how many gout flares have you had in the past year? Select one.    2   Not selected:    1    More than 2   Do your current symptoms feel similar to gout flares you've had in the past? Select one.    Yes   Not selected:    No   To recommend the best treatment for you, we need to see photos of the affected joint. If you choose not to send photos, you can still continue this interview. Select one.    I'd rather not send photos.   Not selected:    OK, I'll send photos.   In addition to your joint symptoms, have you had any of these? Select all that apply.    None of the above   Not selected:    Fever    Chills    Malaise, or a general feeling of illness   Do either of these apply to you? Select all that apply.    Neither of the above   Not selected:    Recent injury or trauma to the affected joints    Recent surgery, medical procedure, or dental procedure   When was the last time you had a gout flare? Select one.    3 to 6 months ago   Not selected:    Within the last month    1 to 3 months ago    6 to 12 months ago    More than a year ago    I'm not sure   Gout tends to be more common in people who have certain medical conditions. Do you have or have you had any of these? Select all that apply.    High blood pressure   Not selected:    Diabetes    High cholesterol    Obesity    Angina (chest pain due to decreased blood flow to the heart)    Heart attack    Congestive heart failure (CHF)    Stroke    TIA (transient ischemic attack) or  "\"mini-stroke\"    Peripheral vascular disease, or narrowing of blood vessels to the limbs    None of the above   Some medications used to treat gout flares shouldn't be used in patients who've had certain stomach or intestinal conditions. Have you had any of these? Select all that apply.    None of the above   Not selected:    Bleeding from your stomach or intestines    Ulcer in your stomach    Ulcer in your small intestine   Do you have any of these conditions? Select all that apply.    None of the above   Not selected:    Aspirin triad (also known as Samter's triad or ASA triad)    Asthma or hives from taking aspirin or other NSAIDs, such as ibuprofen or naproxen    Cerebral malaria    Systemic fungal infection   Do you have any of these conditions that can affect the immune system? Scroll to see all options. Select all that apply.    None of these   Not selected:    History of bone marrow transplant    Chronic kidney disease    Chronic liver disease (including cirrhosis)    HIV/AIDS    Inflammatory bowel disease (Crohn's disease or ulcerative colitis)    Lupus    Moderate to severe plaque psoriasis    Multiple sclerosis    Rheumatoid arthritis    Sickle cell anemia    Alpha or beta thalassemia    History of kidney, liver, heart, or other solid organ transplant    History of liver, heart, or other solid organ transplant    History of spleen removal    An autoimmune disorder not listed here (specify)    A condition requiring treatment with long-term use of oral steroids (such as prednisone, prednisolone, or dexamethasone) (specify)   Have you ever been diagnosed with cancer? Select one.    No   Not selected:    Yes, I have cancer now    Yes, but I'm in remission   Have you tried any medications for your current symptoms? Select one.    Yes   Not selected:    No   Diclofenac    Not helpful   Not selected:    Helpful   Ibuprofen    Not helpful   Not selected:    Helpful   Have you taken any medications for gout flares " in the past? Select one.    Yes   Not selected:    No   Oral steroids, such as prednisone or methylprednisolone    Helpful   Not selected:    Not helpful   Are you still taking these medications listed in your medical record? If you're not taking any of these, click Next. Select all that apply.    amLODIPine 5 MG tablet    olmesartan 40 MG tablet   Are you taking any other medications, vitamins, or supplements? Select one.    No   Not selected:    Yes   Have you ever had an allergic or bad reaction to any medication? Select one.    No   Not selected:    Yes   One of the medications used to prevent gout flares can have severe side effects in people of certain ethnicities. Do you identify as any of these? Select all that apply.    None of the above   Not selected:        Chinese    Urdu    Palestinian   Do you need a doctor's note? A doctor's note confirms that you received care today and states when you can return to school or work. It does not contain information about your diagnosis or treatment plan. Your provider will make the final decision on whether to give you a doctor's note and for how long. Doctor's notes CANNOT be backdated. We can't provide medical leave paperwork through this type of visit. If more paperwork is needed to request time off, contact your primary care provider. Select one.    No   Not selected:    Today only (1 day)    Today and tomorrow (2 days)    3 days   Is there anything you'd like to add about your symptoms? Please limit your comments to the symptoms covered in this interview. If you include comments about other concerns, your provider may recommend that you be seen in person.    __Unable to bear weight on right ankle __   ----------   Medical history   Medical history data does not currently exist for this patient.

## 2024-03-21 ENCOUNTER — OFFICE VISIT (OUTPATIENT)
Dept: FAMILY MEDICINE CLINIC | Age: 69
End: 2024-03-21
Payer: MEDICARE

## 2024-03-21 VITALS
WEIGHT: 258 LBS | HEART RATE: 80 BPM | DIASTOLIC BLOOD PRESSURE: 96 MMHG | OXYGEN SATURATION: 97 % | HEIGHT: 72 IN | SYSTOLIC BLOOD PRESSURE: 169 MMHG | BODY MASS INDEX: 34.95 KG/M2

## 2024-03-21 DIAGNOSIS — Z00.00 ENCOUNTER FOR MEDICAL EXAMINATION TO ESTABLISH CARE: Primary | ICD-10-CM

## 2024-03-21 DIAGNOSIS — M10.9 GOUT, UNSPECIFIED CAUSE, UNSPECIFIED CHRONICITY, UNSPECIFIED SITE: ICD-10-CM

## 2024-03-21 DIAGNOSIS — E78.5 HYPERLIPIDEMIA LDL GOAL <100: ICD-10-CM

## 2024-03-21 DIAGNOSIS — I10 PRIMARY HYPERTENSION: ICD-10-CM

## 2024-03-21 DIAGNOSIS — F10.10 ALCOHOL ABUSE: ICD-10-CM

## 2024-03-21 RX ORDER — PRAVASTATIN SODIUM 10 MG
10 TABLET ORAL DAILY
Qty: 90 TABLET | Refills: 0 | Status: SHIPPED | OUTPATIENT
Start: 2024-03-21

## 2024-03-21 RX ORDER — ALLOPURINOL 100 MG/1
100 TABLET ORAL DAILY
Qty: 90 TABLET | Refills: 0 | Status: SHIPPED | OUTPATIENT
Start: 2024-03-21

## 2024-03-21 NOTE — ASSESSMENT & PLAN NOTE
Most recent attack was a few weeks ago, was treated with steroids. Has never been on preventative allopurinol.  Will start this medication.  Patient also understands that his large amount of alcohol intake can be contributing to his gout flareups

## 2024-03-21 NOTE — PROGRESS NOTES
"Blair Rodriguez presents to [facility] with complaint of  Establish Care (Moved here from [place]. )    SUBJECTIVE  History of Present Illness    Patient is here to establish care. He moved here from [place] last year. Saw a PCP at [facility] but is switching here.  He is .  He is retired.  He has chronic medical conditions of hypertension, hyperlipidemia, gout, diverticulosis, history of colon polyps, see arthritis low back pain.  He is a smoker.  Drinks 28 alcoholic beverages per week, typically has 2 martinis and 2-3 vodkas per night..  Patient also has history of cocaine use.  He continues to use marijuana a few times per week.    Patient is currently prescribed pravastatin 10 mg daily, olmesartan 40 mg daily, amlodipine 5 mg daily.  Patient stopped taking amlodipine recently because it \"made him feel like a zombie.\"  He was switched to this medication back in October, HCTZ had to be discontinued as it was causing gout flareup.  Patient also stopped taking statin on his own.  Patient says that he \"does not believe in pills.\"  Patient's blood pressure is very elevated in office today.  He is asymptomatic of this.    The following portions of the patient's history were reviewed and updated as appropriate: allergies, current medications, past family history, past medical history, past social history, past surgical history and problem list.    OBJECTIVE  Vital Signs:   /96 (BP Location: Right arm, Patient Position: Sitting)   Pulse 80   Ht 182.9 cm (72\")   Wt 117 kg (258 lb)   SpO2 97% Comment: room air  BMI 34.99 kg/m²       Physical Exam  Constitutional:       General: He is not in acute distress.     Appearance: Normal appearance. He is not ill-appearing.   HENT:      Head: Normocephalic and atraumatic.      Nose: Nose normal.      Mouth/Throat:      Pharynx: Oropharynx is clear.   Cardiovascular:      Rate and Rhythm: Normal rate and regular rhythm.      " Pulses: Normal pulses.      Heart sounds: Normal heart sounds.   Pulmonary:      Effort: Pulmonary effort is normal. No respiratory distress.      Breath sounds: Normal breath sounds.   Chest:      Chest wall: No tenderness.   Musculoskeletal:         General: Normal range of motion.      Cervical back: Normal range of motion and neck supple.   Skin:     General: Skin is warm and dry.   Neurological:      General: No focal deficit present.      Mental Status: He is alert and oriented to person, place, and time. Mental status is at baseline.   Psychiatric:         Mood and Affect: Mood normal.         Behavior: Behavior normal.          Results Review:  The following data was reviewed by Anahi Salgado, DARREL [unfilled] 11:36 EDT.  PSA Screen (09/12/2023 09:33)  Vitamin B12 (09/12/2023 09:33)  CBC & Differential (09/12/2023 09:33)  Comprehensive Metabolic Panel (09/12/2023 09:33)  Lipid Panel (09/12/2023 09:33)  Urinalysis With Microscopic If Indicated (No Culture) - Urine, Clean Catch (09/12/2023 09:33)  TSH Rfx On Abnormal To Free T4 (09/12/2023 09:33)  Prolactin (09/12/2023 09:33)  Testosterone (09/12/2023 09:33)  Office Visit with Elbert Ashton DO (10/24/2023)     ASSESSMENT AND PLAN:  Diagnoses and all orders for this visit:    1. Encounter for medical examination to establish care (Primary)    2. Primary hypertension  Assessment & Plan:  Blood pressure is not well-controlled.  He will continue olmesartan 40 mg daily.  Patient is also agreeable to trying half of a 5 mg tablet of amlodipine.  Discussed with patient that I doubt this medication strength will be strong enough to help control his blood pressure but he is not really willing to try any other medications at this time.  Patient understands that uncontrolled blood pressure puts him at risk for kidney disease, stroke, heart attack, and other vascular disorders.  Recommend 1 month follow-up but he would like to push this back to 3 months.    Orders:  -      amLODIPine (NORVASC) 2.5 MG tablet; Take 1 tablet by mouth Daily. (Patient is using 5 mg current supply and breaking in half)  Dispense: 1 tablet; Refill: 0    3. Hyperlipidemia LDL goal <100  Assessment & Plan:  Patient is agreeable to resuming pravastatin after discussing long-term risk of uncontrolled hyperlipidemia.  Low-fat diet also encouraged.  Will check lipid panel in 3 months.    Orders:  -     pravastatin (PRAVACHOL) 10 MG tablet; Take 1 tablet by mouth Daily.  Dispense: 90 tablet; Refill: 0  -     Comprehensive metabolic panel; Future  -     Lipid panel; Future    4. Alcohol abuse  Assessment & Plan:  Recommend gradual alcohol weaning, patient is not interested in alcohol cessation    Orders:  -     Comprehensive metabolic panel; Future    5. Gout, unspecified cause, unspecified chronicity, unspecified site  Assessment & Plan:  Most recent attack was a few weeks ago, was treated with steroids. Has never been on preventative allopurinol.  Will start this medication.  Patient also understands that his large amount of alcohol intake can be contributing to his gout flareups    Orders:  -     allopurinol (ZYLOPRIM) 100 MG tablet; Take 1 tablet by mouth Daily.  Dispense: 90 tablet; Refill: 0  -     Uric acid; Future            Follow Up   Return in about 3 months (around 6/21/2024). Patient to notify office with any acute concerns or issues.  Patient verbalizes understanding, agrees with plan of care and has no further questions upon discharge.     Patient was given instructions and counseling regarding his condition or for health maintenance advice. Please see specific information pulled into the AVS if appropriate.     Discussed the importance of following up with any needed screening tests/labs/specialist appointments and any requested follow-up recommended by me today. Importance of maintaining follow-up discussed and patient accepts that missed appointments can delay diagnosis and potentially lead to  worsening of conditions.    Part of this note may be an electronic transcription/translation of spoken language to printed text using the Dragon Dictation System.

## 2024-03-25 RX ORDER — AMLODIPINE BESYLATE 2.5 MG/1
2.5 TABLET ORAL DAILY
Qty: 1 TABLET | Refills: 0 | OUTPATIENT
Start: 2024-03-25

## 2024-03-25 NOTE — ASSESSMENT & PLAN NOTE
Patient is agreeable to resuming pravastatin after discussing long-term risk of uncontrolled hyperlipidemia.  Low-fat diet also encouraged.  Will check lipid panel in 3 months.

## 2024-03-25 NOTE — ASSESSMENT & PLAN NOTE
Blood pressure is not well-controlled.  He will continue olmesartan 40 mg daily.  Patient is also agreeable to trying half of a 5 mg tablet of amlodipine.  Discussed with patient that I doubt this medication strength will be strong enough to help control his blood pressure but he is not really willing to try any other medications at this time.  Patient understands that uncontrolled blood pressure puts him at risk for kidney disease, stroke, heart attack, and other vascular disorders.  Recommend 1 month follow-up but he would like to push this back to 3 months.

## 2024-04-27 DIAGNOSIS — I10 PRIMARY HYPERTENSION: ICD-10-CM

## 2024-04-28 DIAGNOSIS — I10 PRIMARY HYPERTENSION: ICD-10-CM

## 2024-04-29 ENCOUNTER — TELEPHONE (OUTPATIENT)
Dept: FAMILY MEDICINE CLINIC | Age: 69
End: 2024-04-29
Payer: MEDICARE

## 2024-04-29 RX ORDER — OLMESARTAN MEDOXOMIL 40 MG/1
40 TABLET ORAL DAILY
Qty: 90 TABLET | Refills: 1 | Status: SHIPPED | OUTPATIENT
Start: 2024-04-29 | End: 2024-05-01 | Stop reason: SDUPTHER

## 2024-04-29 RX ORDER — OLMESARTAN MEDOXOMIL 40 MG/1
40 TABLET ORAL DAILY
Qty: 90 TABLET | Refills: 1 | OUTPATIENT
Start: 2024-04-29

## 2024-04-29 NOTE — TELEPHONE ENCOUNTER
Rx Refill Note  Requested Prescriptions      No prescriptions requested or ordered in this encounter      Last office visit with prescribing clinician: 3/21/2024   Last telemedicine visit with prescribing clinician: Visit date not found   Next office visit with prescribing clinician: 6/21/2024       Felipa Martin LPN  04/29/24, 16:23 EDT    Not filled by you previously, please adv.

## 2024-04-29 NOTE — TELEPHONE ENCOUNTER
Caller: Patricia Rodriguez    Relationship: Emergency Contact    Best call back number: [phone]    Requested Prescriptions:     olmesartan (BENICAR) 40 MG tablet  40 mg, Daily          Pharmacy where request should be sent[facility]    [id number] [place] [facility] [address] [facility] - [phone]  - [phone]       Last office visit with prescribing clinician: 3/21/2024   Last telemedicine visit with prescribing clinician: Visit date not found   Next office visit with prescribing clinician: 6/21/2024     Additional details provided by patient: PATIENT HAS ONE PILL LEFT AND PRESCRIPTION WAS FILLED PREVIOUSLY BY ANOTHER PROVIDER    Does the patient have less than a 3 day supply:  [x] Yes  [] No    Would you like a call back once the refill request has been completed: [] Yes [] No    If the office needs to give you a call back, can they leave a voicemail: [x] Yes [] No    Susie Lewis Rep   04/29/24 16:17 EDT

## 2024-05-01 ENCOUNTER — TELEPHONE (OUTPATIENT)
Dept: FAMILY MEDICINE CLINIC | Age: 69
End: 2024-05-01
Payer: MEDICARE

## 2024-05-01 DIAGNOSIS — I10 PRIMARY HYPERTENSION: ICD-10-CM

## 2024-05-01 RX ORDER — OLMESARTAN MEDOXOMIL 40 MG/1
40 TABLET ORAL DAILY
Qty: 90 TABLET | Refills: 1 | Status: SHIPPED | OUTPATIENT
Start: 2024-05-01

## 2024-05-01 NOTE — TELEPHONE ENCOUNTER
Caller: Patricia Rodriguez    Relationship to patient: Emergency Contact    Best call back number: [phone]    Patient is needing: PATIENT'S WIFE CALLED IN AND IS WANTING TO HAVE DARREL SPENCE WRITE A PRESCRIPTION FOR     olmesartan (BENICAR) 40 MG tablet   AND IS NOT WANTING TO  PRESCRIPTION FROM OLD PROVIDER THAT WAS SENT IN YESTERDAY AS IT WENT TO [facility] AND SHE WANTS ALL PRESCRIPTIONS PRESCRIBED THROUGH NEW PCP (DARREL SPENCE) AND WOULD LIKE [facility] [id number] - [place], [place] - [address] [facility] [phone]  - [phone]  [phone] TO BE CHOSEN PHARMACY. PATIENT'S WIFE SAID PATIENT IS OUT OF PRESCRIPTION AND WOULD LIKE NEW OLMESARTAN PRESCRIPTION SENT TO [facility] TODAY PLEASE. PATIENT'S WIFE IS REQUESTING A CALL BACK WHEN IT IS SENT.

## 2024-05-01 NOTE — TELEPHONE ENCOUNTER
Lmovm to inf wife rx filled 4/29/24 by Dr Ashton for 90ds with 1 refill. Was sent to [facility] in [place]. Adv wife to have that rx canceled or pt would have to pay oop for med as it is too soon to refill. If pt wants pcp to prescribe med call back after canceled.      HUB TO READ

## 2024-05-01 NOTE — TELEPHONE ENCOUNTER
Name: Patricia Rodrigeuz    Relationship: Emergency Contact    Best Callback Number: [phone]    HUB PROVIDED THE RELAY MESSAGE FROM THE OFFICE   PATIENT VOICED UNDERSTANDING AND HAS NO FURTHER QUESTIONS AT THIS TIME    ADDITIONAL INFORMATION:       THE PATIENT'S WIFE SAID THE PRESCRIPTION IN [place] HAS BEEN CANCELLED AND IS REQUESTING PCP TO SEND IT OVER TO THE PHARMACY REQUESTED

## 2024-06-16 DIAGNOSIS — E78.5 HYPERLIPIDEMIA LDL GOAL <100: ICD-10-CM

## 2024-06-16 DIAGNOSIS — M10.9 GOUT, UNSPECIFIED CAUSE, UNSPECIFIED CHRONICITY, UNSPECIFIED SITE: ICD-10-CM

## 2024-06-17 RX ORDER — PRAVASTATIN SODIUM 10 MG
10 TABLET ORAL DAILY
Qty: 90 TABLET | Refills: 0 | Status: SHIPPED | OUTPATIENT
Start: 2024-06-17

## 2024-06-17 RX ORDER — ALLOPURINOL 100 MG/1
100 TABLET ORAL DAILY
Qty: 90 TABLET | Refills: 0 | Status: SHIPPED | OUTPATIENT
Start: 2024-06-17

## 2024-06-19 ENCOUNTER — LAB (OUTPATIENT)
Dept: LAB | Facility: HOSPITAL | Age: 69
End: 2024-06-19
Payer: MEDICARE

## 2024-06-19 DIAGNOSIS — F10.10 ALCOHOL ABUSE: ICD-10-CM

## 2024-06-19 DIAGNOSIS — M10.9 GOUT, UNSPECIFIED CAUSE, UNSPECIFIED CHRONICITY, UNSPECIFIED SITE: ICD-10-CM

## 2024-06-19 DIAGNOSIS — E78.5 HYPERLIPIDEMIA LDL GOAL <100: ICD-10-CM

## 2024-06-19 LAB
ALBUMIN SERPL-MCNC: 4.1 G/DL (ref 3.5–5.2)
ALBUMIN/GLOB SERPL: 1.5 G/DL
ALP SERPL-CCNC: 87 U/L (ref 39–117)
ALT SERPL W P-5'-P-CCNC: 71 U/L (ref 1–41)
ANION GAP SERPL CALCULATED.3IONS-SCNC: 11.2 MMOL/L (ref 5–15)
AST SERPL-CCNC: 67 U/L (ref 1–40)
BILIRUB SERPL-MCNC: 0.8 MG/DL (ref 0–1.2)
BUN SERPL-MCNC: 10 MG/DL (ref 8–23)
BUN/CREAT SERPL: 11 (ref 7–25)
CALCIUM SPEC-SCNC: 8.9 MG/DL (ref 8.6–10.5)
CHLORIDE SERPL-SCNC: 107 MMOL/L (ref 98–107)
CHOLEST SERPL-MCNC: 193 MG/DL (ref 0–200)
CO2 SERPL-SCNC: 23.8 MMOL/L (ref 22–29)
CREAT SERPL-MCNC: 0.91 MG/DL (ref 0.76–1.27)
EGFRCR SERPLBLD CKD-EPI 2021: 91.8 ML/MIN/1.73
GLOBULIN UR ELPH-MCNC: 2.7 GM/DL
GLUCOSE SERPL-MCNC: 98 MG/DL (ref 65–99)
HDLC SERPL-MCNC: 41 MG/DL (ref 40–60)
LDLC SERPL CALC-MCNC: 114 MG/DL (ref 0–100)
LDLC/HDLC SERPL: 2.64 {RATIO}
POTASSIUM SERPL-SCNC: 3.6 MMOL/L (ref 3.5–5.2)
PROT SERPL-MCNC: 6.8 G/DL (ref 6–8.5)
SODIUM SERPL-SCNC: 142 MMOL/L (ref 136–145)
TRIGL SERPL-MCNC: 218 MG/DL (ref 0–150)
URATE SERPL-MCNC: 6.7 MG/DL (ref 3.4–7)
VLDLC SERPL-MCNC: 38 MG/DL (ref 5–40)

## 2024-06-19 PROCEDURE — 80053 COMPREHEN METABOLIC PANEL: CPT

## 2024-06-19 PROCEDURE — 84550 ASSAY OF BLOOD/URIC ACID: CPT

## 2024-06-19 PROCEDURE — 36415 COLL VENOUS BLD VENIPUNCTURE: CPT

## 2024-06-19 PROCEDURE — 80061 LIPID PANEL: CPT

## 2024-06-21 ENCOUNTER — OFFICE VISIT (OUTPATIENT)
Dept: FAMILY MEDICINE CLINIC | Age: 69
End: 2024-06-21
Payer: MEDICARE

## 2024-06-21 VITALS
BODY MASS INDEX: 34.13 KG/M2 | DIASTOLIC BLOOD PRESSURE: 88 MMHG | WEIGHT: 252 LBS | HEIGHT: 72 IN | SYSTOLIC BLOOD PRESSURE: 160 MMHG | HEART RATE: 87 BPM

## 2024-06-21 DIAGNOSIS — F10.20 ALCOHOLISM: ICD-10-CM

## 2024-06-21 DIAGNOSIS — I10 PRIMARY HYPERTENSION: Primary | ICD-10-CM

## 2024-06-21 DIAGNOSIS — M1A.00X0 IDIOPATHIC CHRONIC GOUT WITHOUT TOPHUS, UNSPECIFIED SITE: ICD-10-CM

## 2024-06-21 DIAGNOSIS — R74.01 TRANSAMINITIS: ICD-10-CM

## 2024-06-21 DIAGNOSIS — E78.5 HYPERLIPIDEMIA LDL GOAL <100: ICD-10-CM

## 2024-06-21 DIAGNOSIS — E66.9 OBESITY (BMI 30-39.9): ICD-10-CM

## 2024-06-21 PROBLEM — F41.1 GAD (GENERALIZED ANXIETY DISORDER): Status: RESOLVED | Noted: 2023-10-24 | Resolved: 2024-06-21

## 2024-06-21 PROCEDURE — 99214 OFFICE O/P EST MOD 30 MIN: CPT | Performed by: NURSE PRACTITIONER

## 2024-06-21 PROCEDURE — 3077F SYST BP >= 140 MM HG: CPT | Performed by: NURSE PRACTITIONER

## 2024-06-21 PROCEDURE — G2211 COMPLEX E/M VISIT ADD ON: HCPCS | Performed by: NURSE PRACTITIONER

## 2024-06-21 PROCEDURE — 3079F DIAST BP 80-89 MM HG: CPT | Performed by: NURSE PRACTITIONER

## 2024-06-21 RX ORDER — AMLODIPINE BESYLATE 10 MG/1
10 TABLET ORAL DAILY
Qty: 90 TABLET | Refills: 1 | Status: SHIPPED | OUTPATIENT
Start: 2024-06-21

## 2024-06-21 NOTE — ASSESSMENT & PLAN NOTE
Blood pressure is not at goal.  He will continue olmesartan 40 mg daily, increasing amlodipine to 10 mg daily.  Ambulatory blood pressure monitoring encouraged.  Blood pressure will be reassessed in 3 months.

## 2024-06-21 NOTE — PROGRESS NOTES
"Blair Rodriguze presents to [facility] with complaint of  Hypertension (3 month follow up. Has not taking bp meds today )    SUBJECTIVE  History of Present Illness    Patient is here for 3-month follow-up of hypertension, gout and hyperlipidemia.  He had labs completed a few days ago that showed uric acid level of 6.7, down from 10.1 at his last visit, he was started on allopurinol 100 mg daily.  Patient says this medication has helped tremendously and he is not having any joint pain now.    Recent labs also showed LDL level of 114, total cholesterol 193, triglycerides 218.  He was started on pravastatin 3 months ago, prior lipid readings include total cholesterol of 252, triglycerides 181, .    In regards to his hypertension, he is currently prescribed amlodipine 2.5 mg daily, olmesartan 40 mg daily.  Patient's blood pressure is again very elevated in office today.  Patient says that he has not taken his blood pressure medications this morning, he takes them in the evening. BP is averaging in the 160s systolic at home.    ALT and AST also improved, previous was 115 and 100, recently 71 and 67.  Patient continues to drink alcohol excessively and he admits to being a \"functional alcoholic.\"  He does not really express a desire to cut back on alcohol use.  He has been using a supplement to help detox his liver he says.      OBJECTIVE  Vital Signs:   /88   Pulse 87   Ht 182.9 cm (72\")   Wt 114 kg (252 lb)   BMI 34.18 kg/m²       Physical Exam  Constitutional:       General: He is not in acute distress.     Appearance: Normal appearance. He is obese. He is not ill-appearing.   HENT:      Head: Normocephalic and atraumatic.      Nose: Nose normal.      Mouth/Throat:      Pharynx: Oropharynx is clear.   Cardiovascular:      Rate and Rhythm: Normal rate and regular rhythm.      Pulses: Normal pulses.      Heart sounds: Normal heart sounds.   Pulmonary:      Effort: " Pulmonary effort is normal. No respiratory distress.      Breath sounds: Normal breath sounds.   Chest:      Chest wall: No tenderness.   Musculoskeletal:         General: Normal range of motion.      Cervical back: Normal range of motion and neck supple.   Skin:     General: Skin is warm and dry.   Neurological:      General: No focal deficit present.      Mental Status: He is alert and oriented to person, place, and time. Mental status is at baseline.   Psychiatric:         Mood and Affect: Mood normal.         Behavior: Behavior normal.          Results Review:  The following data was reviewed by Anahi Salgado, DARREL [unfilled] 10:46 EDT.  Lipid panel (06/19/2024 11:14)  Comprehensive metabolic panel (06/19/2024 11:14)  Uric acid (06/19/2024 11:14)    ASSESSMENT AND PLAN:  Diagnoses and all orders for this visit:    1. Primary hypertension (Primary)  Assessment & Plan:  Blood pressure is not at goal.  He will continue olmesartan 40 mg daily, increasing amlodipine to 10 mg daily.  Ambulatory blood pressure monitoring encouraged.  Blood pressure will be reassessed in 3 months.    Orders:  -     amLODIPine (NORVASC) 10 MG tablet; Take 1 tablet by mouth Daily.  Dispense: 90 tablet; Refill: 1    2. Alcoholism  Assessment & Plan:  Again recommended cessation      3. Hyperlipidemia LDL goal <100  Assessment & Plan:  Patient is tolerating pravastatin well.  Lipid panel improved.  He will continue 10 mg daily.  Recheck lipid panel in 3 months.    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  Continue allopurinol 100 mg daily      5. Transaminitis  Assessment & Plan:  Improved, may be due to improvement of his cholesterol levels.  Patient is concerned about this regardless and he would like to have liver ultrasound which I think is reasonable given his alcoholism.  Will also  recheck CMP in 3 months as patient is taking herbal supplement that is supposed to detox liver, he is unsure of name of supplement    Orders:  -     Comprehensive Metabolic Panel; Future  -     US Liver; Future    6. Obesity (BMI 30-39.9)            Follow Up   Return in about 3 months (around 9/21/2024). Patient to notify office with any acute concerns or issues.  Patient verbalizes understanding, agrees with plan of care and has no further questions upon discharge.     Patient was given instructions and counseling regarding his condition or for health maintenance advice. Please see specific information pulled into the AVS if appropriate.     Discussed the importance of following up with any needed screening tests/labs/specialist appointments and any requested follow-up recommended by me today. Importance of maintaining follow-up discussed and patient accepts that missed appointments can delay diagnosis and potentially lead to worsening of conditions.    Part of this note may be an electronic transcription/translation of spoken language to printed text using the Dragon Dictation System.

## 2024-06-21 NOTE — ASSESSMENT & PLAN NOTE
Patient is tolerating pravastatin well.  Lipid panel improved.  He will continue 10 mg daily.  Recheck lipid panel in 3 months.

## 2024-06-21 NOTE — ASSESSMENT & PLAN NOTE
Uric acid level improved, he has had no gout flareups since starting allopurinol.  Continue allopurinol 100 mg daily

## 2024-06-21 NOTE — ASSESSMENT & PLAN NOTE
Improved, may be due to improvement of his cholesterol levels.  Patient is concerned about this regardless and he would like to have liver ultrasound which I think is reasonable given his alcoholism.  Will also recheck CMP in 3 months as patient is taking herbal supplement that is supposed to detox liver, he is unsure of name of supplement

## 2024-07-25 DIAGNOSIS — E78.5 HYPERLIPIDEMIA LDL GOAL <100: ICD-10-CM

## 2024-07-25 RX ORDER — PRAVASTATIN SODIUM 10 MG
10 TABLET ORAL DAILY
Qty: 90 TABLET | Refills: 0 | OUTPATIENT
Start: 2024-07-25

## 2024-08-15 DIAGNOSIS — E78.5 HYPERLIPIDEMIA LDL GOAL <100: ICD-10-CM

## 2024-08-15 RX ORDER — PRAVASTATIN SODIUM 10 MG
10 TABLET ORAL DAILY
Qty: 90 TABLET | Refills: 0 | OUTPATIENT
Start: 2024-08-15

## 2024-08-16 ENCOUNTER — HOSPITAL ENCOUNTER (OUTPATIENT)
Dept: ULTRASOUND IMAGING | Facility: HOSPITAL | Age: 69
Discharge: HOME | End: 2024-08-16
Payer: MEDICARE

## 2024-08-16 DIAGNOSIS — R74.01 TRANSAMINITIS: ICD-10-CM

## 2024-08-16 PROCEDURE — 76705 ECHO EXAM OF ABDOMEN: CPT

## 2024-09-13 DIAGNOSIS — M10.9 GOUT, UNSPECIFIED CAUSE, UNSPECIFIED CHRONICITY, UNSPECIFIED SITE: ICD-10-CM

## 2024-09-13 DIAGNOSIS — E78.5 HYPERLIPIDEMIA LDL GOAL <100: ICD-10-CM

## 2024-09-13 RX ORDER — ALLOPURINOL 100 MG/1
100 TABLET ORAL DAILY
Qty: 90 TABLET | Refills: 0 | Status: SHIPPED | OUTPATIENT
Start: 2024-09-13

## 2024-09-13 RX ORDER — PRAVASTATIN SODIUM 10 MG
10 TABLET ORAL DAILY
Qty: 90 TABLET | Refills: 0 | Status: SHIPPED | OUTPATIENT
Start: 2024-09-13

## 2024-10-21 ENCOUNTER — TELEPHONE (OUTPATIENT)
Dept: FAMILY MEDICINE CLINIC | Age: 69
End: 2024-10-21
Payer: MEDICARE

## 2024-10-25 DIAGNOSIS — I10 PRIMARY HYPERTENSION: ICD-10-CM

## 2024-10-25 RX ORDER — OLMESARTAN MEDOXOMIL 40 MG/1
40 TABLET ORAL DAILY
Qty: 30 TABLET | Refills: 1 | Status: SHIPPED | OUTPATIENT
Start: 2024-10-25

## 2024-11-18 ENCOUNTER — LAB (OUTPATIENT)
Dept: LAB | Facility: HOSPITAL | Age: 69
End: 2024-11-18
Payer: MEDICARE

## 2024-11-18 DIAGNOSIS — R74.01 TRANSAMINITIS: ICD-10-CM

## 2024-11-18 DIAGNOSIS — E78.5 HYPERLIPIDEMIA LDL GOAL <100: ICD-10-CM

## 2024-11-18 LAB
ALBUMIN SERPL-MCNC: 4.4 G/DL (ref 3.5–5.2)
ALBUMIN/GLOB SERPL: 1.8 G/DL
ALP SERPL-CCNC: 92 U/L (ref 39–117)
ALT SERPL W P-5'-P-CCNC: 59 U/L (ref 1–41)
ANION GAP SERPL CALCULATED.3IONS-SCNC: 13.3 MMOL/L (ref 5–15)
AST SERPL-CCNC: 65 U/L (ref 1–40)
BILIRUB SERPL-MCNC: 1.3 MG/DL (ref 0–1.2)
BUN SERPL-MCNC: 11 MG/DL (ref 8–23)
BUN/CREAT SERPL: 10.6 (ref 7–25)
CALCIUM SPEC-SCNC: 8.5 MG/DL (ref 8.6–10.5)
CHLORIDE SERPL-SCNC: 101 MMOL/L (ref 98–107)
CHOLEST SERPL-MCNC: 208 MG/DL (ref 0–200)
CO2 SERPL-SCNC: 24.7 MMOL/L (ref 22–29)
CREAT SERPL-MCNC: 1.04 MG/DL (ref 0.76–1.27)
EGFRCR SERPLBLD CKD-EPI 2021: 78.2 ML/MIN/1.73
GLOBULIN UR ELPH-MCNC: 2.5 GM/DL
GLUCOSE SERPL-MCNC: 111 MG/DL (ref 65–99)
HDLC SERPL-MCNC: 48 MG/DL (ref 40–60)
LDLC SERPL CALC-MCNC: 134 MG/DL (ref 0–100)
LDLC/HDLC SERPL: 2.72 {RATIO}
POTASSIUM SERPL-SCNC: 3.2 MMOL/L (ref 3.5–5.2)
PROT SERPL-MCNC: 6.9 G/DL (ref 6–8.5)
SODIUM SERPL-SCNC: 139 MMOL/L (ref 136–145)
TRIGL SERPL-MCNC: 147 MG/DL (ref 0–150)
VLDLC SERPL-MCNC: 26 MG/DL (ref 5–40)

## 2024-11-18 PROCEDURE — 36415 COLL VENOUS BLD VENIPUNCTURE: CPT

## 2024-11-18 PROCEDURE — 80053 COMPREHEN METABOLIC PANEL: CPT

## 2024-11-18 PROCEDURE — 80061 LIPID PANEL: CPT

## 2024-11-19 DIAGNOSIS — E78.5 HYPERLIPIDEMIA LDL GOAL <100: ICD-10-CM

## 2024-11-19 DIAGNOSIS — I10 PRIMARY HYPERTENSION: ICD-10-CM

## 2024-11-19 DIAGNOSIS — M10.9 GOUT, UNSPECIFIED CAUSE, UNSPECIFIED CHRONICITY, UNSPECIFIED SITE: ICD-10-CM

## 2024-11-19 DIAGNOSIS — E87.6 HYPOKALEMIA: Primary | ICD-10-CM

## 2024-11-19 RX ORDER — AMLODIPINE BESYLATE 10 MG/1
10 TABLET ORAL DAILY
Qty: 30 TABLET | Refills: 0 | Status: SHIPPED | OUTPATIENT
Start: 2024-11-19

## 2024-11-19 RX ORDER — PRAVASTATIN SODIUM 10 MG
10 TABLET ORAL DAILY
Qty: 30 TABLET | Refills: 0 | Status: SHIPPED | OUTPATIENT
Start: 2024-11-19

## 2024-11-19 RX ORDER — ALLOPURINOL 100 MG/1
100 TABLET ORAL DAILY
Qty: 30 TABLET | Refills: 0 | Status: SHIPPED | OUTPATIENT
Start: 2024-11-19

## 2024-12-09 ENCOUNTER — OFFICE VISIT (OUTPATIENT)
Dept: FAMILY MEDICINE CLINIC | Age: 69
End: 2024-12-09
Payer: MEDICARE

## 2024-12-09 VITALS
HEIGHT: 72 IN | WEIGHT: 251 LBS | OXYGEN SATURATION: 97 % | BODY MASS INDEX: 34 KG/M2 | SYSTOLIC BLOOD PRESSURE: 132 MMHG | DIASTOLIC BLOOD PRESSURE: 74 MMHG | TEMPERATURE: 98.1 F | HEART RATE: 81 BPM

## 2024-12-09 DIAGNOSIS — F10.20 ALCOHOLISM: ICD-10-CM

## 2024-12-09 DIAGNOSIS — I10 PRIMARY HYPERTENSION: ICD-10-CM

## 2024-12-09 DIAGNOSIS — R74.01 TRANSAMINITIS: ICD-10-CM

## 2024-12-09 DIAGNOSIS — Z13.29 SCREENING FOR THYROID DISORDER: ICD-10-CM

## 2024-12-09 DIAGNOSIS — E78.5 HYPERLIPIDEMIA LDL GOAL <100: ICD-10-CM

## 2024-12-09 DIAGNOSIS — M10.9 GOUT, UNSPECIFIED CAUSE, UNSPECIFIED CHRONICITY, UNSPECIFIED SITE: ICD-10-CM

## 2024-12-09 DIAGNOSIS — Z00.00 ENCOUNTER FOR MEDICARE ANNUAL WELLNESS EXAM: Primary | ICD-10-CM

## 2024-12-09 DIAGNOSIS — Z00.00 ROUTINE ADULT HEALTH MAINTENANCE: ICD-10-CM

## 2024-12-09 DIAGNOSIS — Z12.5 SCREENING FOR MALIGNANT NEOPLASM OF PROSTATE: ICD-10-CM

## 2024-12-09 PROCEDURE — 99214 OFFICE O/P EST MOD 30 MIN: CPT | Performed by: NURSE PRACTITIONER

## 2024-12-09 PROCEDURE — 3075F SYST BP GE 130 - 139MM HG: CPT | Performed by: NURSE PRACTITIONER

## 2024-12-09 PROCEDURE — G0439 PPPS, SUBSEQ VISIT: HCPCS | Performed by: NURSE PRACTITIONER

## 2024-12-09 PROCEDURE — 1126F AMNT PAIN NOTED NONE PRSNT: CPT | Performed by: NURSE PRACTITIONER

## 2024-12-09 PROCEDURE — 1170F FXNL STATUS ASSESSED: CPT | Performed by: NURSE PRACTITIONER

## 2024-12-09 PROCEDURE — 3078F DIAST BP <80 MM HG: CPT | Performed by: NURSE PRACTITIONER

## 2024-12-09 RX ORDER — OLMESARTAN MEDOXOMIL 40 MG/1
40 TABLET ORAL DAILY
Qty: 90 TABLET | Refills: 1 | Status: SHIPPED | OUTPATIENT
Start: 2024-12-09

## 2024-12-09 RX ORDER — ALLOPURINOL 100 MG/1
100 TABLET ORAL DAILY
Qty: 90 TABLET | Refills: 1 | Status: SHIPPED | OUTPATIENT
Start: 2024-12-09

## 2024-12-09 RX ORDER — AMLODIPINE BESYLATE 10 MG/1
10 TABLET ORAL DAILY
Qty: 90 TABLET | Refills: 1 | Status: SHIPPED | OUTPATIENT
Start: 2024-12-09

## 2024-12-09 RX ORDER — PRAVASTATIN SODIUM 10 MG
10 TABLET ORAL DAILY
Qty: 90 TABLET | Refills: 1 | Status: SHIPPED | OUTPATIENT
Start: 2024-12-09

## 2024-12-09 NOTE — PROGRESS NOTES
Subjective   The ABCs of the Annual Wellness Visit  Medicare Wellness Visit      Blair Rodriguez is a 69 y.o. patient who presents for a Medicare Wellness Visit.    The following portions of the patient's history were reviewed and   updated as appropriate: allergies, current medications, past family history, past medical history, past social history, past surgical history, and problem list.    Compared to one year ago, the patient's physical   health is the same.  Compared to one year ago, the patient's mental   health is the same.    Recent Hospitalizations:  He was not admitted to the hospital during the last year.     Current Medical Providers:  Patient Care Team:  Anahi Salgado APRN as PCP - General (Nurse Practitioner)    Outpatient Medications Prior to Visit   Medication Sig Dispense Refill    multivitamin with minerals (One-A-Day Proactive 65+) tablet tablet Take 1 tablet by mouth Daily.      allopurinol (ZYLOPRIM) 100 MG tablet Take 1 tablet by mouth Daily. 30 tablet 0    amLODIPine (NORVASC) 10 MG tablet Take 1 tablet by mouth Daily. 30 tablet 0    olmesartan (BENICAR) 40 MG tablet Take 1 tablet by mouth Daily. PLEASE COMPLETE LABS FOR FURTHER REFILLS 30 tablet 1    pravastatin (PRAVACHOL) 10 MG tablet Take 1 tablet by mouth Daily. 30 tablet 0     No facility-administered medications prior to visit.     No opioid medication identified on active medication list. I have reviewed chart for other potential  high risk medication/s and harmful drug interactions in the elderly.      Aspirin is not on active medication list.  Aspirin use is indicated based on review of current medical condition/s. Pros and cons of this therapy have been discussed with this patient. Benefits of this medication outweigh potential harm.  Patient has been instructed to start taking this medication..    Patient Active Problem List   Diagnosis    Primary hypertension    Heavy alcohol use    Hyperlipidemia LDL goal <100    Acute  "drug-induced gout of right foot    Gout    Transaminitis    Obesity (BMI 30-39.9)     Advance Care Planning Advance Directive is not on file.  ACP discussion was held with the patient during this visit. Patient has an advance directive (not in EMR), copy requested.            Objective   Vitals:    24 1326 24 1432   BP: 142/81 132/74   BP Location: Right arm    Patient Position: Sitting    Cuff Size: Adult    Pulse: 81    Temp: 98.1 °F (36.7 °C)    TempSrc: Oral    SpO2: 97%    Weight: 114 kg (251 lb)    Height: 182.9 cm (72\")    PainSc: 0-No pain        Estimated body mass index is 34.04 kg/m² as calculated from the following:    Height as of this encounter: 182.9 cm (72\").    Weight as of this encounter: 114 kg (251 lb).    BMI is >= 30 and <35. (Class 1 Obesity). The following options were offered after discussion;: weight loss educational material (shared in after visit summary)       Does the patient have evidence of cognitive impairment? No  Lab Results   Component Value Date    TRIG 147 2024    HDL 48 2024     (H) 2024    VLDL 26 2024                                                                                                Health  Risk Assessment    Smoking Status:  Social History     Tobacco Use   Smoking Status Former    Current packs/day: 0.00    Average packs/day: 1 pack/day for 15.0 years (15.0 ttl pk-yrs)    Types: Cigarettes    Start date: 1975    Quit date: 7/3/1986    Years since quittin.4   Smokeless Tobacco Current    Types: Chew   Tobacco Comments    Still use smokeless tobacco     Alcohol Consumption:  Social History     Substance and Sexual Activity   Alcohol Use Yes    Alcohol/week: 28.0 standard drinks of alcohol    Types: 28 Drinks containing 0.5 oz of alcohol per week       Fall Risk Screen  STEADI Fall Risk Assessment was completed, and patient is at LOW risk for falls.Assessment completed on:2024    Depression Screening "   Little interest or pleasure in doing things? Not at all   Feeling down, depressed, or hopeless? Not at all   PHQ-2 Total Score 0      Health Habits and Functional and Cognitive Screenin/9/2024     1:50 PM   Functional & Cognitive Status   Do you have difficulty preparing food and eating? No   Do you have difficulty bathing yourself, getting dressed or grooming yourself? No   Do you have difficulty using the toilet? No   Do you have difficulty moving around from place to place? No   Do you have trouble with steps or getting out of a bed or a chair? No   Current Diet Well Balanced Diet   Dental Exam Up to date   Eye Exam Not up to date   Exercise (times per week) 5 times per week   Current Exercises Include Walking;Yard Work   Do you need help using the phone?  No   Are you deaf or do you have serious difficulty hearing?  No   Do you need help to go to places out of walking distance? No   Do you need help shopping? No   Do you need help preparing meals?  No   Do you need help with housework?  No   Do you need help with laundry? No   Do you need help taking your medications? No   Do you need help managing money? No   Do you ever drive or ride in a car without wearing a seat belt? No   Have you felt unusual stress, anger or loneliness in the last month? No   Who do you live with? Spouse   If you need help, do you have trouble finding someone available to you? No   Have you been bothered in the last four weeks by sexual problems? No   Do you have difficulty concentrating, remembering or making decisions? No           Age-appropriate Screening Schedule:  Refer to the list below for future screening recommendations based on patient's age, sex and/or medical conditions. Orders for these recommended tests are listed in the plan section. The patient has been provided with a written plan.    Health Maintenance List  Health Maintenance   Topic Date Due    ZOSTER VACCINE (2 of 3) 10/25/2016    Pneumococcal Vaccine  65+ (2 of 2 - PCV) 05/26/2022    BMI FOLLOWUP  09/11/2024    HEPATITIS C SCREENING  03/21/2025 (Originally 9/11/2023)    COVID-19 Vaccine (4 - 2024-25 season) 12/09/2025 (Originally 9/1/2024)    LIPID PANEL  11/18/2025    ANNUAL WELLNESS VISIT  12/09/2025    COLORECTAL CANCER SCREENING  06/24/2029    TDAP/TD VACCINES (3 - Td or Tdap) 03/02/2033    INFLUENZA VACCINE  Completed    AAA SCREEN (ONE-TIME)  Completed                                                                                                                                                CMS Preventative Services Quick Reference  Risk Factors Identified During Encounter  Alcohol Misuse:  drinking 5 alcoholic beverages on average, occasionally 9 beverages a day  Immunizations Discussed/Encouraged: Prevnar 20 (Pneumococcal 20-valent conjugate) and Shingrix  Dental Screening Recommended  Vision Screening Recommended    The above risks/problems have been discussed with the patient.  Pertinent information has been shared with the patient in the After Visit Summary.  An After Visit Summary and PPPS were made available to the patient.    Follow Up:   Next Medicare Wellness visit to be scheduled in 1 year.         Additional E&M Note during same encounter follows:  Patient has additional, significant, and separately identifiable condition(s)/problem(s) that require work above and beyond the Medicare Wellness Visit     Chief Complaint  Medicare Wellness-subsequent    Subjective   HPI  Blair is also being seen today for additional medical problem/s of hypertension, hyperlipidemia, transaminitis, alcoholism, gout.  He is on pravastatin 10 mg daily.  Had cholesterol levels checked last month that showed LDL of 134, up from 114.  Patient admits to not taking medication regularly.  For hypertension, he is on olmesartan 40 mg daily, amlodipine 10 mg daily.  Blood pressure is well-controlled today in office.  For gout, he is on allopurinol 100 mg daily.  He has not  "had any gout flareup since starting this medication.  On his labs, his potassium level was 3.2.  He is agreeable to having this rechecked today.                Objective   Vital Signs:  /74   Pulse 81   Temp 98.1 °F (36.7 °C) (Oral)   Ht 182.9 cm (72\")   Wt 114 kg (251 lb)   SpO2 97%   BMI 34.04 kg/m²   Physical Exam  Constitutional:       General: He is not in acute distress.     Appearance: Normal appearance. He is obese. He is not ill-appearing.   HENT:      Head: Normocephalic and atraumatic.      Nose: Nose normal.      Mouth/Throat:      Pharynx: Oropharynx is clear.   Cardiovascular:      Rate and Rhythm: Normal rate and regular rhythm.      Pulses: Normal pulses.      Heart sounds: Normal heart sounds.   Pulmonary:      Effort: Pulmonary effort is normal. No respiratory distress.      Breath sounds: Normal breath sounds.   Chest:      Chest wall: No tenderness.   Musculoskeletal:         General: Normal range of motion.      Cervical back: Normal range of motion and neck supple.   Skin:     General: Skin is warm and dry.   Neurological:      General: No focal deficit present.      Mental Status: He is alert and oriented to person, place, and time. Mental status is at baseline.   Psychiatric:         Mood and Affect: Mood normal.         Behavior: Behavior normal.         The following data was reviewed by: DARREL Garcia on 12/09/2024:    Lab on 11/18/2024   Component Date Value Ref Range Status    Glucose 11/18/2024 111 (H)  65 - 99 mg/dL Final    BUN 11/18/2024 11  8 - 23 mg/dL Final    Creatinine 11/18/2024 1.04  0.76 - 1.27 mg/dL Final    Sodium 11/18/2024 139  136 - 145 mmol/L Final    Potassium 11/18/2024 3.2 (L)  3.5 - 5.2 mmol/L Final    Chloride 11/18/2024 101  98 - 107 mmol/L Final    CO2 11/18/2024 24.7  22.0 - 29.0 mmol/L Final    Calcium 11/18/2024 8.5 (L)  8.6 - 10.5 mg/dL Final    Total Protein 11/18/2024 6.9  6.0 - 8.5 g/dL Final    Albumin 11/18/2024 4.4  3.5 - 5.2 g/dL Final "    ALT (SGPT) 11/18/2024 59 (H)  1 - 41 U/L Final    AST (SGOT) 11/18/2024 65 (H)  1 - 40 U/L Final    Alkaline Phosphatase 11/18/2024 92  39 - 117 U/L Final    Total Bilirubin 11/18/2024 1.3 (H)  0.0 - 1.2 mg/dL Final    Globulin 11/18/2024 2.5  gm/dL Final    A/G Ratio 11/18/2024 1.8  g/dL Final    BUN/Creatinine Ratio 11/18/2024 10.6  7.0 - 25.0 Final    Anion Gap 11/18/2024 13.3  5.0 - 15.0 mmol/L Final    eGFR 11/18/2024 78.2  >60.0 mL/min/1.73 Final    Total Cholesterol 11/18/2024 208 (H)  0 - 200 mg/dL Final    Triglycerides 11/18/2024 147  0 - 150 mg/dL Final    HDL Cholesterol 11/18/2024 48  40 - 60 mg/dL Final    LDL Cholesterol  11/18/2024 134 (H)  0 - 100 mg/dL Final    VLDL Cholesterol 11/18/2024 26  5 - 40 mg/dL Final    LDL/HDL Ratio 11/18/2024 2.72   Final               Assessment and Plan Additional age appropriate preventative wellness advice topics were discussed during today's preventative wellness exam(some topics already addressed during AWV portion of the note above):    Physical Activity: Advised cardiovascular activity 150 minutes per week as tolerated. (example brisk walk for 30 minutes, 5 days a week).     Nutrition: Discussed nutrition plan with patient. Information shared in after visit summary. Goal is for a well balanced diet to enhance overall health.     Healthy Weight: Discussed current and goal BMI with patient. Steps to attain this goal discussed. Information shared in after visit summary.     Alcohol Misuse Discussion: Information shared in after visit summary.     Motor Vehicle Safety Discussion:  Wearing Seatbelt While in Motor Vehicle recommendation. Adhering to posted speed limit recommendation.     Injury Prevention Discussion:  Information shared in after visit summary.                 Encounter for Medicare annual wellness exam         Alcoholism  Has decreased alcohol intake, but still using regularly.  Discussed that he should limit alcoholic beverages to 3/day.        Hyperlipidemia LDL goal <100  Offered to increase pravastatin to 20 mg however since he is not taking medication consistently, he will start doing this first.  Will recheck lipid panel in 6 months.  Low-fat diet encouraged.  He has not tolerated other statins in the past.  Fortunately, he seems to be tolerating pravastatin 10 mg okay whenever he does remember to take it    Orders:    pravastatin (PRAVACHOL) 10 MG tablet; Take 1 tablet by mouth Daily.    Lipid Panel; Future    Primary hypertension  Blood pressure is well-controlled, continue current regimen as below    Orders:    olmesartan (BENICAR) 40 MG tablet; Take 1 tablet by mouth Daily.    amLODIPine (NORVASC) 10 MG tablet; Take 1 tablet by mouth Daily.    Transaminitis  Improved, liver ultrasound consistent with fatty liver.  Continue reducing alcohol intake.  Orders:    Comprehensive Metabolic Panel; Future    Gout, unspecified cause, unspecified chronicity, unspecified site  Improved, continue allopurinol.   Orders:    allopurinol (ZYLOPRIM) 100 MG tablet; Take 1 tablet by mouth Daily.    Uric Acid; Future    Screening for thyroid disorder    Orders:    TSH Rfx On Abnormal To Free T4; Future    Routine adult health maintenance    Orders:    CBC & Differential; Future    Screening for malignant neoplasm of prostate    Orders:    PSA Screen; Future            Follow Up   Return in about 6 months (around 6/9/2025).  Patient was given instructions and counseling regarding his condition or for health maintenance advice. Please see specific information pulled into the AVS if appropriate.

## 2024-12-09 NOTE — ASSESSMENT & PLAN NOTE
Blood pressure is well-controlled, continue current regimen as below    Orders:    olmesartan (BENICAR) 40 MG tablet; Take 1 tablet by mouth Daily.    amLODIPine (NORVASC) 10 MG tablet; Take 1 tablet by mouth Daily.

## 2024-12-09 NOTE — ASSESSMENT & PLAN NOTE
Improved, continue allopurinol.   Orders:    allopurinol (ZYLOPRIM) 100 MG tablet; Take 1 tablet by mouth Daily.    Uric Acid; Future

## 2024-12-09 NOTE — ASSESSMENT & PLAN NOTE
Improved, liver ultrasound consistent with fatty liver.  Continue reducing alcohol intake.  Orders:    Comprehensive Metabolic Panel; Future

## 2024-12-09 NOTE — ASSESSMENT & PLAN NOTE
Offered to increase pravastatin to 20 mg however since he is not taking medication consistently, he will start doing this first.  Will recheck lipid panel in 6 months.  Low-fat diet encouraged.  He has not tolerated other statins in the past.  Fortunately, he seems to be tolerating pravastatin 10 mg okay whenever he does remember to take it    Orders:    pravastatin (PRAVACHOL) 10 MG tablet; Take 1 tablet by mouth Daily.    Lipid Panel; Future

## 2024-12-11 DIAGNOSIS — I10 PRIMARY HYPERTENSION: ICD-10-CM

## 2024-12-25 DIAGNOSIS — M10.9 GOUT, UNSPECIFIED CAUSE, UNSPECIFIED CHRONICITY, UNSPECIFIED SITE: ICD-10-CM

## 2024-12-26 RX ORDER — ALLOPURINOL 100 MG/1
100 TABLET ORAL DAILY
Qty: 30 TABLET | OUTPATIENT
Start: 2024-12-26

## 2025-03-10 ENCOUNTER — OFFICE VISIT (OUTPATIENT)
Dept: FAMILY MEDICINE CLINIC | Age: 70
End: 2025-03-10
Payer: MEDICARE

## 2025-03-10 VITALS
OXYGEN SATURATION: 100 % | HEIGHT: 72 IN | HEART RATE: 86 BPM | TEMPERATURE: 98.1 F | BODY MASS INDEX: 33.92 KG/M2 | DIASTOLIC BLOOD PRESSURE: 65 MMHG | SYSTOLIC BLOOD PRESSURE: 120 MMHG | WEIGHT: 250.4 LBS

## 2025-03-10 DIAGNOSIS — R13.10 DYSPHAGIA, UNSPECIFIED TYPE: Primary | ICD-10-CM

## 2025-03-10 DIAGNOSIS — I10 PRIMARY HYPERTENSION: ICD-10-CM

## 2025-03-10 DIAGNOSIS — K21.9 GASTROESOPHAGEAL REFLUX DISEASE, UNSPECIFIED WHETHER ESOPHAGITIS PRESENT: ICD-10-CM

## 2025-03-10 PROCEDURE — 3074F SYST BP LT 130 MM HG: CPT | Performed by: STUDENT IN AN ORGANIZED HEALTH CARE EDUCATION/TRAINING PROGRAM

## 2025-03-10 PROCEDURE — 99214 OFFICE O/P EST MOD 30 MIN: CPT | Performed by: STUDENT IN AN ORGANIZED HEALTH CARE EDUCATION/TRAINING PROGRAM

## 2025-03-10 PROCEDURE — 3078F DIAST BP <80 MM HG: CPT | Performed by: STUDENT IN AN ORGANIZED HEALTH CARE EDUCATION/TRAINING PROGRAM

## 2025-03-10 PROCEDURE — 1126F AMNT PAIN NOTED NONE PRSNT: CPT | Performed by: STUDENT IN AN ORGANIZED HEALTH CARE EDUCATION/TRAINING PROGRAM

## 2025-03-10 RX ORDER — FAMOTIDINE 40 MG/1
40 TABLET, FILM COATED ORAL DAILY
Qty: 30 TABLET | Refills: 5 | Status: SHIPPED | OUTPATIENT
Start: 2025-03-10 | End: 2025-03-10

## 2025-03-10 RX ORDER — FAMOTIDINE 40 MG/1
40 TABLET, FILM COATED ORAL DAILY
Qty: 30 TABLET | Refills: 0 | Status: SHIPPED | OUTPATIENT
Start: 2025-03-10

## 2025-03-10 NOTE — ASSESSMENT & PLAN NOTE
Blood pressure is elevated this visit at 147/75, rechecked prior to leaving and was 120/65.  Continue taking olmesartan and amlodipine as directed.

## 2025-03-10 NOTE — PROGRESS NOTES
Chief Complaint     Difficulty Swallowing (Pt c/o a feeling that something is stuck in his throat that comes and goes, this has been going on for a few months. Pt states that he notices this feeling more in the evening. Pt reports that he and his wife have 2 martinis with olives every night and thinks this may be an allergy to olives or heartburn./Pt states that he does have instances where it is hard for him to swallow, but this does not block his airway.//Pt states that he does dip as well so he has concerns./)    History of Present Illness     Blair Rodriguez is a 69 y.o. male who presents to [facility] with complaints of difficulty swallowing.       History of Present Illness  The patient is a 69-year-old male who presents for evaluation of difficulty swallowing.    He has been experiencing a sensation of an obstruction in his throat for several months, which has caused him significant anxiety due to a recent personal loss to throat cancer. He reports occasional difficulty swallowing dense foods such as steak, necessitating the use of water to facilitate regurgitation. This issue is not frequent but has been persistent over the past few years. He also mentions a distinct spot in his throat that seems to be affected. He has not undergone an EGD. He still has his tonsils and sometimes feels like he can feel just a very slight something. He has observed that the sensation tends to occur after consuming martinis with olives, leading him to suspect a potential allergy to olives. He has not attempted to consume martinis without olives. He consumed two martinis the previous night and felt well, but experienced the sensation again this morning after drinking a Pepsi, a beverage he had not consumed in a year.    He also experiences nasal drainage, which he attributes to mouth breathing due to a history of nasal fractures. The drainage appears to originate from the right side,  corresponding to the location of the throat sensation. His wife has noted a mild cough, which he believes is a result of the nasal drainage. He occasionally experiences heartburn, which he associates with excessive lemonade consumption.    Blood pressure is elevated this visit at 147/75, rechecked prior to leaving and was 120/65. He continues to take his blood pressure medication in the morning, although he has not taken it today due to fasting. He reports that his blood pressure is consistently high during doctor visits.    Supplemental Information  He has been prescribed cholesterol medication, which he takes at night due to adverse effects when taken during the day.    SOCIAL HISTORY  The patient consumes alcohol, specifically martinis.    ALLERGIES  The patient is concerned about a potential allergy to OLIVES.         History      Past Medical History:   Diagnosis Date    Hypertension     Kidney stone     Low back pain 6 months       Past Surgical History:   Procedure Laterality Date    COLONOSCOPY  4 years    FOOT SURGERY Left 1969       Family History   Problem Relation Age of Onset    Alcohol abuse Mother     Cancer Mother         Current Medications        Current Outpatient Medications:     allopurinol (ZYLOPRIM) 100 MG tablet, Take 1 tablet by mouth Daily., Disp: 90 tablet, Rfl: 1    amLODIPine (NORVASC) 10 MG tablet, Take 1 tablet by mouth Daily., Disp: 90 tablet, Rfl: 1    multivitamin with minerals (One-A-Day Proactive 65+) tablet tablet, Take 1 tablet by mouth Daily., Disp: , Rfl:     Nutritional Supplements (JOINT FORMULA PO), Take  by mouth., Disp: , Rfl:     olmesartan (BENICAR) 40 MG tablet, Take 1 tablet by mouth Daily., Disp: 90 tablet, Rfl: 1    pravastatin (PRAVACHOL) 10 MG tablet, Take 1 tablet by mouth Daily., Disp: 90 tablet, Rfl: 1    famotidine (PEPCID) 40 MG tablet, Take 1 tablet by mouth Daily. Disregard the one with refills please., Disp: 30 tablet, Rfl: 0     Allergies     No Known  "Allergies    Social History       Social History     Social History Narrative    Not on file       Immunizations     Immunization:  Immunization History   Administered Date(s) Administered    COVID-19 (MODERNA) 1st,2nd,3rd Dose Monovalent 03/29/2021, 04/26/2021, 12/29/2021    Fluad Quad 65+ 10/04/2021, 09/29/2022, 09/23/2023    Fluzone High-Dose 65+YRS 10/10/2024    Influenza Injectable Mdck Pf Quad 11/09/2020    Pneumococcal Polysaccharide (PPSV23) 05/26/2021    Td (TDVAX) 03/02/2023    Tdap 06/03/2010    Zostavax 08/30/2016          Objective     Objective     Vital Signs:   /65 (BP Location: Right arm, Patient Position: Sitting) Comment: recheck  Pulse 86   Temp 98.1 °F (36.7 °C) (Oral)   Ht 182.9 cm (72\")   Wt 114 kg (250 lb 6.4 oz)   SpO2 100% Comment: room air  BMI 33.96 kg/m²       Physical Exam  Vitals and nursing note reviewed.   Constitutional:       Appearance: Normal appearance. He is obese.   HENT:      Head: Normocephalic.      Mouth/Throat:      Lips: Pink.      Mouth: Mucous membranes are moist.      Pharynx: Uvula midline.   Eyes:      Conjunctiva/sclera: Conjunctivae normal.      Pupils: Pupils are equal, round, and reactive to light.   Cardiovascular:      Rate and Rhythm: Normal rate and regular rhythm.      Pulses: Normal pulses.      Heart sounds: Normal heart sounds.   Pulmonary:      Effort: Pulmonary effort is normal.      Breath sounds: Normal breath sounds.   Abdominal:      General: Bowel sounds are normal.      Palpations: Abdomen is soft.   Musculoskeletal:         General: Normal range of motion.      Cervical back: Normal range of motion and neck supple.   Lymphadenopathy:      Cervical: No cervical adenopathy.   Skin:     General: Skin is warm and dry.   Neurological:      General: No focal deficit present.      Mental Status: He is alert and oriented to person, place, and time.   Psychiatric:         Attention and Perception: Attention normal.         Mood and Affect: " Mood and affect normal.         Behavior: Behavior normal. Behavior is cooperative.         Physical Exam  Throat was examined.  Lungs were auscultated.    Vital Signs  Blood pressure is slightly elevated.      Results    The following data was reviewed by: DARREL Vivar on 03/10/25               Results           Assessment and Plan        Assessment and Plan       Dysphagia, unspecified type    Orders:    Ambulatory Referral to Gastroenterology  Will send to gastro for further evaluation and possible EGD.  Gastroesophageal reflux disease, unspecified whether esophagitis present    Orders:    famotidine (PEPCID) 40 MG tablet; Take 1 tablet by mouth Daily. Disregard the one with refills please.  Will send to gastro for further evaluation and possible EGD.  Will follow-up with PCP in the next 4 weeks to see how he is doing on the Pepcid.  Primary hypertension    Blood pressure is elevated this visit at 147/75, rechecked prior to leaving and was 120/65.  Continue taking olmesartan and amlodipine as directed.          Educated on hospital precautions.  Assessment & Plan  1. Dysphagia.  He reports difficulty swallowing dense foods like steak and chicken, which has been ongoing for a few years. Recently, he noticed the sensation after consuming martinis with olives. He also experiences nasal drainage, which may contribute to his symptoms. A referral to a gastroenterologist will be initiated for further evaluation, and possibly an Esophagogastroduodenoscopy (EGD). A prescription for famotidine 20 mg once daily will be sent to [facility] pharmacy to manage potential acid reflux symptoms.    2. Elevated blood pressure.  His blood pressure was slightly elevated during this visit. He is currently on blood pressure medication, which he takes in the morning. His blood pressure will be rechecked before he leaves the clinic today to ensure it is within a safe range.    Follow-up  The patient will follow up in 1  month.        Follow Up        Follow Up   Return in about 4 weeks (around 4/7/2025) for With PCP, Recheck, sooner if condition worsens.  Patient was given instructions and counseling regarding his condition or for health maintenance advice. Please see specific information pulled into the AVS if appropriate.      Patient or patient representative verbalized consent for the use of Ambient Listening during the visit with  DARREL Vivar for chart documentation. 3/10/2025  14:58 EDT

## 2025-03-11 ENCOUNTER — TELEPHONE (OUTPATIENT)
Dept: GASTROENTEROLOGY | Facility: CLINIC | Age: 70
End: 2025-03-11
Payer: MEDICARE

## 2025-04-06 DIAGNOSIS — K21.9 GASTROESOPHAGEAL REFLUX DISEASE, UNSPECIFIED WHETHER ESOPHAGITIS PRESENT: ICD-10-CM

## 2025-04-07 RX ORDER — FAMOTIDINE 40 MG/1
40 TABLET, FILM COATED ORAL DAILY
Qty: 30 TABLET | Refills: 0 | OUTPATIENT
Start: 2025-04-07

## 2025-04-25 ENCOUNTER — CLINICAL SUPPORT (OUTPATIENT)
Dept: GASTROENTEROLOGY | Facility: CLINIC | Age: 70
End: 2025-04-25
Payer: MEDICARE

## 2025-04-25 ENCOUNTER — PREP FOR SURGERY (OUTPATIENT)
Dept: OTHER | Facility: HOSPITAL | Age: 70
End: 2025-04-25
Payer: MEDICARE

## 2025-04-25 DIAGNOSIS — R13.10 DYSPHAGIA, UNSPECIFIED TYPE: ICD-10-CM

## 2025-04-25 DIAGNOSIS — K21.9 GASTROESOPHAGEAL REFLUX DISEASE, UNSPECIFIED WHETHER ESOPHAGITIS PRESENT: Primary | ICD-10-CM

## 2025-04-28 DIAGNOSIS — Z12.11 COLON CANCER SCREENING: Primary | ICD-10-CM

## 2025-04-28 RX ORDER — SODIUM, POTASSIUM,MAG SULFATES 17.5-3.13G
1 SOLUTION, RECONSTITUTED, ORAL ORAL EVERY 12 HOURS
Qty: 354 ML | Refills: 0 | Status: SHIPPED | OUTPATIENT
Start: 2025-04-28

## 2025-04-28 NOTE — PROGRESS NOTES
Received a referral to add a colon to his scheduled EGD. Attempted to contact the patient to inform him. Spoke to ENDO scheduling, added a colonoscopy, also sent a bowel prep to the patients pharmacy on file.

## 2025-04-30 ENCOUNTER — TELEPHONE (OUTPATIENT)
Dept: GASTROENTEROLOGY | Facility: CLINIC | Age: 70
End: 2025-04-30
Payer: MEDICARE

## 2025-04-30 ENCOUNTER — OFFICE VISIT (OUTPATIENT)
Dept: FAMILY MEDICINE CLINIC | Age: 70
End: 2025-04-30
Payer: MEDICARE

## 2025-04-30 VITALS
HEIGHT: 72 IN | DIASTOLIC BLOOD PRESSURE: 78 MMHG | BODY MASS INDEX: 33.89 KG/M2 | TEMPERATURE: 97.6 F | WEIGHT: 250.2 LBS | SYSTOLIC BLOOD PRESSURE: 148 MMHG | OXYGEN SATURATION: 96 % | HEART RATE: 79 BPM

## 2025-04-30 DIAGNOSIS — I10 PRIMARY HYPERTENSION: ICD-10-CM

## 2025-04-30 DIAGNOSIS — R21 RASH: Primary | ICD-10-CM

## 2025-04-30 PROCEDURE — 1160F RVW MEDS BY RX/DR IN RCRD: CPT

## 2025-04-30 PROCEDURE — 1126F AMNT PAIN NOTED NONE PRSNT: CPT

## 2025-04-30 PROCEDURE — 3078F DIAST BP <80 MM HG: CPT

## 2025-04-30 PROCEDURE — 3077F SYST BP >= 140 MM HG: CPT

## 2025-04-30 PROCEDURE — 1159F MED LIST DOCD IN RCRD: CPT

## 2025-04-30 PROCEDURE — 99213 OFFICE O/P EST LOW 20 MIN: CPT

## 2025-04-30 RX ORDER — TRIAMCINOLONE ACETONIDE 1 MG/G
1 CREAM TOPICAL 2 TIMES DAILY
Qty: 30 G | Refills: 0 | Status: SHIPPED | OUTPATIENT
Start: 2025-04-30

## 2025-04-30 RX ORDER — TADALAFIL 5 MG/1
5 TABLET ORAL DAILY PRN
COMMUNITY

## 2025-04-30 RX ORDER — ASPIRIN 81 MG/1
81 TABLET ORAL DAILY
COMMUNITY

## 2025-04-30 NOTE — TELEPHONE ENCOUNTER
Attempted to contact patient to inform him that Colonoscopy was added to his EGD on 5/15/25. No answer, LVM for pt to contact the office. Also left instructions in patients VM stating his bowel prep was sent to his pharmacy and I sent patient a [employer] message with instructions.

## 2025-04-30 NOTE — PROGRESS NOTES
Subjective     CHIEF COMPLAINT    Chief Complaint   Patient presents with    Rash     Bilateral legs, (R) arm, and back. X 3 weeks.        History of Present Illness:  Blair Rodriguez is a 69 y.o. male who presents to [facility] with acute complaint of rash.  Rash has been present for about 3 weeks.  He has recently been in the woods [place].  He used permethrin prior to developing the rash to help keep ticks off.  He initially developed a rash on his legs but now is getting similar rash on his back.  Rash does not itch and is not painful.  No bug bites that he knows of.  No drainage or bleeding from the rash.  No associated symptoms including fever, chills, shortness of breath, wheezing or chest pain.  No recent body aches or unintentional weight loss.  No recent antibiotic use.  No changes to soaps, lotions or detergents. Wife thinks the rash located to his legs has improved some.     Review of Systems   Constitutional:  Negative for chills and fever.   Respiratory:  Negative for shortness of breath and wheezing.    Cardiovascular:  Negative for chest pain.   Gastrointestinal:  Negative for nausea and vomiting.   Musculoskeletal:  Negative for myalgias.   Skin:  Positive for rash.         Past Medical History:   Diagnosis Date    GERD (gastroesophageal reflux disease)     Hypertension     Kidney stone     Low back pain 6 months         Past Surgical History:   Procedure Laterality Date    COLONOSCOPY  4 years    FOOT SURGERY Left 1969         Family History   Problem Relation Age of Onset    Alcohol abuse Mother     Cancer Mother     Colon cancer Neg Hx          Social History     Socioeconomic History    Marital status:    Tobacco Use    Smoking status: Former     Current packs/day: 0.00     Average packs/day: 1 pack/day for 15.0 years (15.0 ttl pk-yrs)     Types: Cigarettes     Start date: 1975     Quit date: 7/3/1986     Years since quittin.8     Passive  "exposure: Past    Smokeless tobacco: Current     Types: Chew    Tobacco comments:     Still use smokeless tobacco   Vaping Use    Vaping status: Never Used   Substance and Sexual Activity    Alcohol use: Yes     Alcohol/week: 28.0 standard drinks of alcohol     Types: 28 Drinks containing 0.5 oz of alcohol per week    Drug use: Yes     Types: Marijuana    Sexual activity: Yes     Partners: Female         No Known Allergies       Current Outpatient Medications on File Prior to Visit   Medication Sig Dispense Refill    allopurinol (ZYLOPRIM) 100 MG tablet Take 1 tablet by mouth Daily. 90 tablet 1    amLODIPine (NORVASC) 10 MG tablet Take 1 tablet by mouth Daily. 90 tablet 1    aspirin 81 MG EC tablet Take 1 tablet by mouth Daily.      famotidine (PEPCID) 40 MG tablet Take 1 tablet by mouth Daily. Disregard the one with refills please. 30 tablet 0    multivitamin with minerals (One-A-Day Proactive 65+) tablet tablet Take 1 tablet by mouth Daily.      Nutritional Supplements (JOINT FORMULA PO) Take  by mouth.      olmesartan (BENICAR) 40 MG tablet Take 1 tablet by mouth Daily. 90 tablet 1    pravastatin (PRAVACHOL) 10 MG tablet Take 1 tablet by mouth Daily. 90 tablet 1    sodium-potassium-magnesium sulfates (Suprep Bowel Prep Kit) 17.5-3.13-1.6 GM/177ML solution oral solution Take 1 bottle by mouth Every 12 (Twelve) Hours. 354 mL 0    tadalafil (CIALIS) 5 MG tablet Take 1 tablet by mouth Daily As Needed for Erectile Dysfunction.       No current facility-administered medications on file prior to visit.        /78   Pulse 79   Temp 97.6 °F (36.4 °C) (Oral)   Ht 182.9 cm (72.01\")   Wt 113 kg (250 lb 3.2 oz)   SpO2 96% Comment: room air  BMI 33.93 kg/m²       Objective     Physical Exam  Vitals and nursing note reviewed.   Constitutional:       General: He is not in acute distress.     Appearance: Normal appearance. He is not ill-appearing.   HENT:      Head: Normocephalic.   Pulmonary:      Effort: No " accessory muscle usage or respiratory distress.   Musculoskeletal:      Cervical back: Normal range of motion.   Skin:     General: Skin is warm and dry.      Capillary Refill: Capillary refill takes less than 2 seconds.      Findings: Rash present. Rash is macular. Rash is not crusting, pustular, scaling, urticarial or vesicular.      Comments: Scattered, erythematous macules located to bilateral lower extremities and back. No acitve drainage or bleeding. No vesicular appearance or evidence of infection. No crusting.    Neurological:      General: No focal deficit present.      Mental Status: He is alert and oriented to person, place, and time.   Psychiatric:         Mood and Affect: Mood normal.         Behavior: Behavior normal.         Assessment & Plan  Rash  Unclear etiology. Will trial treatment with kenalog cream and send referral to derm. Return precautions advised.   Orders:    triamcinolone (KENALOG) 0.1 % cream; Apply 1 Application topically to the appropriate area as directed 2 (Two) Times a Day.    Ambulatory Referral to Dermatology    Primary hypertension  Blood pressure elevated in office, recommend home monitoring, follow up with PCP if remains elevated.                  Follow up:  Return if symptoms worsen or fail to improve.  Patient was given instructions and counseling regarding his condition or for health maintenance advice. Please see specific information pulled into the AVS if appropriate.

## 2025-05-01 ENCOUNTER — TELEPHONE (OUTPATIENT)
Dept: GASTROENTEROLOGY | Facility: CLINIC | Age: 70
End: 2025-05-01
Payer: MEDICARE

## 2025-05-01 NOTE — ASSESSMENT & PLAN NOTE
Blood pressure elevated in office, recommend home monitoring, follow up with PCP if remains elevated.

## 2025-05-01 NOTE — TELEPHONE ENCOUNTER
ENDO RECONCILIATION  Verify source of procedure(s): Nurse/MA direct access  04/2025  If other, please list source: referral from PCP for colon screening and dysphagia  TIME OUT-CONFIRM CORRECT PROCEDURE: EGD & Colonoscopy  Cardiology: none  Pulmonology: none  Blood thinner:  none  GLP-1: none  Additional DX/indication for procedure: gerd, dysphagia, colon screening, hx colon polyps 2019  Please include any other notes relevant to endo reconciliation:  N/A

## 2025-05-14 ENCOUNTER — ANESTHESIA EVENT (OUTPATIENT)
Dept: GASTROENTEROLOGY | Facility: HOSPITAL | Age: 70
End: 2025-05-14
Payer: MEDICARE

## 2025-05-14 NOTE — ANESTHESIA PREPROCEDURE EVALUATION
Anesthesia Evaluation     Patient summary reviewed and Nursing notes reviewed   NPO Solid Status: > 8 hours  NPO Liquid Status: > 4 hours           Airway   Mallampati: I  TM distance: >3 FB  Neck ROM: full  No difficulty expected and Anterior  Dental - normal exam     Pulmonary     breath sounds clear to auscultation  (+) a smoker (former) Former,  Cardiovascular   Exercise tolerance: good (4-7 METS)    Rhythm: regular  Rate: normal    (+) hypertension well controlled, hyperlipidemia      Neuro/Psych  GI/Hepatic/Renal/Endo    (+) obesity, GERD well controlled, renal disease- stones    Musculoskeletal     Abdominal    Substance History   (+) alcohol use     OB/GYN          Other   arthritis (Gout),     ROS/Med Hx Other: GERD, dysphagia    ETOH - 28 drinks/week    No EKG on file                 Anesthesia Plan    ASA 3     general   total IV anesthesia  (Total IV Anesthesia    Patient understands anesthesia not responsible for dental damage.        Discussed risks with pt including aspiration, allergic reactions, apnea, advanced airway placement. Pt verbalized understanding. All questions answered.   )  intravenous induction     Anesthetic plan, risks, benefits, and alternatives have been provided, discussed and informed consent has been obtained with: patient and spouse/significant other.  Pre-procedure education provided  Plan discussed with CRNA.      CODE STATUS:

## 2025-05-15 ENCOUNTER — HOSPITAL ENCOUNTER (OUTPATIENT)
Facility: HOSPITAL | Age: 70
Setting detail: HOSPITAL OUTPATIENT SURGERY
Discharge: HOME | End: 2025-05-15
Attending: INTERNAL MEDICINE | Admitting: INTERNAL MEDICINE
Payer: MEDICARE

## 2025-05-15 ENCOUNTER — ANESTHESIA (OUTPATIENT)
Dept: GASTROENTEROLOGY | Facility: HOSPITAL | Age: 70
End: 2025-05-15
Payer: MEDICARE

## 2025-05-15 VITALS
HEART RATE: 69 BPM | SYSTOLIC BLOOD PRESSURE: 129 MMHG | RESPIRATION RATE: 20 BRPM | TEMPERATURE: 97.9 F | WEIGHT: 245.59 LBS | OXYGEN SATURATION: 96 % | BODY MASS INDEX: 33.3 KG/M2 | DIASTOLIC BLOOD PRESSURE: 82 MMHG

## 2025-05-15 DIAGNOSIS — Z12.11 COLON CANCER SCREENING: ICD-10-CM

## 2025-05-15 DIAGNOSIS — K21.9 GASTROESOPHAGEAL REFLUX DISEASE, UNSPECIFIED WHETHER ESOPHAGITIS PRESENT: ICD-10-CM

## 2025-05-15 DIAGNOSIS — R13.10 DYSPHAGIA, UNSPECIFIED TYPE: ICD-10-CM

## 2025-05-15 PROCEDURE — 25010000002 LIDOCAINE PF 2% 2 % SOLUTION: Performed by: NURSE ANESTHETIST, CERTIFIED REGISTERED

## 2025-05-15 PROCEDURE — 25010000002 PROPOFOL 10 MG/ML EMULSION: Performed by: NURSE ANESTHETIST, CERTIFIED REGISTERED

## 2025-05-15 PROCEDURE — 88305 TISSUE EXAM BY PATHOLOGIST: CPT | Performed by: INTERNAL MEDICINE

## 2025-05-15 PROCEDURE — 25810000003 LACTATED RINGERS PER 1000 ML: Performed by: MARRIAGE & FAMILY THERAPIST

## 2025-05-15 DEVICE — DEV CLIP HEMO RESOLUTION360/ULTR 235CM 17MM STRL: Type: IMPLANTABLE DEVICE | Site: SIGMOID COLON | Status: FUNCTIONAL

## 2025-05-15 RX ORDER — PROPOFOL 10 MG/ML
VIAL (ML) INTRAVENOUS AS NEEDED
Status: DISCONTINUED | OUTPATIENT
Start: 2025-05-15 | End: 2025-05-15 | Stop reason: SURG

## 2025-05-15 RX ORDER — SODIUM CHLORIDE, SODIUM LACTATE, POTASSIUM CHLORIDE, CALCIUM CHLORIDE 600; 310; 30; 20 MG/100ML; MG/100ML; MG/100ML; MG/100ML
30 INJECTION, SOLUTION INTRAVENOUS CONTINUOUS
Status: DISCONTINUED | OUTPATIENT
Start: 2025-05-15 | End: 2025-05-15 | Stop reason: HOSPADM

## 2025-05-15 RX ORDER — LIDOCAINE HYDROCHLORIDE 20 MG/ML
INJECTION, SOLUTION EPIDURAL; INFILTRATION; INTRACAUDAL; PERINEURAL AS NEEDED
Status: DISCONTINUED | OUTPATIENT
Start: 2025-05-15 | End: 2025-05-15 | Stop reason: SURG

## 2025-05-15 RX ADMIN — PROPOFOL 200 MCG/KG/MIN: 10 INJECTION, EMULSION INTRAVENOUS at 13:04

## 2025-05-15 RX ADMIN — SODIUM CHLORIDE, POTASSIUM CHLORIDE, SODIUM LACTATE AND CALCIUM CHLORIDE 30 ML/HR: 600; 310; 30; 20 INJECTION, SOLUTION INTRAVENOUS at 12:24

## 2025-05-15 RX ADMIN — LIDOCAINE HYDROCHLORIDE 80 MG: 20 INJECTION, SOLUTION INTRAVENOUS at 13:04

## 2025-05-15 RX ADMIN — PROPOFOL 100 MG: 10 INJECTION, EMULSION INTRAVENOUS at 13:04

## 2025-05-15 NOTE — H&P
ScreeningPre Procedure History & Physical    Chief Complaint:   Screening   gerd    Subjective     HPI:   Screening     Past Medical History:   Past Medical History:   Diagnosis Date    GERD (gastroesophageal reflux disease)     Hypertension     Kidney stone     Low back pain 6 months       Past Surgical History:  Past Surgical History:   Procedure Laterality Date    COLONOSCOPY  4 years    FOOT SURGERY Left 1969       Family History:  Family History   Problem Relation Age of Onset    Alcohol abuse Mother     Cancer Mother     Colon cancer Neg Hx        Social History:   reports that he quit smoking about 38 years ago. His smoking use included cigarettes. He started smoking about 50 years ago. He has a 15 pack-year smoking history. He has been exposed to tobacco smoke. His smokeless tobacco use includes chew. He reports current alcohol use of about 28.0 standard drinks of alcohol per week. He reports current drug use. Drug: Marijuana.    Medications:   Medications Prior to Admission   Medication Sig Dispense Refill Last Dose/Taking    allopurinol (ZYLOPRIM) 100 MG tablet Take 1 tablet by mouth Daily. 90 tablet 1 5/14/2025    amLODIPine (NORVASC) 10 MG tablet Take 1 tablet by mouth Daily. 90 tablet 1 5/14/2025    aspirin 81 MG EC tablet Take 1 tablet by mouth Daily.   Past Week    famotidine (PEPCID) 40 MG tablet Take 1 tablet by mouth Daily. Disregard the one with refills please. 30 tablet 0 5/14/2025    multivitamin with minerals (One-A-Day Proactive 65+) tablet tablet Take 1 tablet by mouth Daily.   Past Month    Nutritional Supplements (JOINT FORMULA PO) Take  by mouth.   5/14/2025    olmesartan (BENICAR) 40 MG tablet Take 1 tablet by mouth Daily. 90 tablet 1 5/14/2025    pravastatin (PRAVACHOL) 10 MG tablet Take 1 tablet by mouth Daily. 90 tablet 1 5/14/2025    triamcinolone (KENALOG) 0.1 % cream Apply 1 Application topically to the appropriate area as directed 2 (Two) Times a Day. 30 g 0 5/14/2025    tadalafil  (CIALIS) 5 MG tablet Take 1 tablet by mouth Daily As Needed for Erectile Dysfunction.          Allergies:  Patient has no known allergies.        Objective     Blood pressure 141/73, pulse 71, temperature 97.2 °F (36.2 °C), temperature source Temporal, resp. rate 21, weight 111 kg (245 lb 9.5 oz), SpO2 99%.    Physical Exam   Constitutional: Pt is oriented to person, place, and time and well-developed, well-nourished, and in no distress.   Mouth/Throat: Oropharynx is clear and moist.   Neck: Normal range of motion.   Cardiovascular: Normal rate, regular rhythm and normal heart sounds.    Pulmonary/Chest: Effort normal and breath sounds normal.   Abdominal: Soft. Nontender  Skin: Skin is warm and dry.   Psychiatric: Mood, memory, affect and judgment normal.     Assessment & Plan     Diagnosis:  Screening colonoscopy  H/o colon polyps  Gerd  dysphagia     Anticipated Surgical Procedure:  Colonoscopy  egd    The risks, benefits, and alternatives of this procedure have been discussed with the patient or the responsible party- the patient understands and agrees to proceed.

## 2025-05-15 NOTE — ANESTHESIA POSTPROCEDURE EVALUATION
Patient: Blair Rodriguez    Procedure Summary       Date: 05/15/25 Room / Location: [facility] ENDOSCOPY 2 / [facility] ENDOSCOPY    Anesthesia Start: 1257 Anesthesia Stop: 1338    Procedures:       ESOPHAGOGASTRODUODENOSCOPY with biopsies      COLONOSCOPY with hot snare polypectomy, clip application x 1, tattoo ink injection Diagnosis:       Gastroesophageal reflux disease, unspecified whether esophagitis present      Dysphagia, unspecified type      Colon cancer screening      (Gastroesophageal reflux disease, unspecified whether esophagitis present [K21.9])      (Dysphagia, unspecified type [R13.10])      (Colon cancer screening [Z12.11])    Surgeons: Jorge Dotson MD Provider: Alisa Jang CRNA    Anesthesia Type: general ASA Status: 3            Anesthesia Type: general    Vitals  Vitals Value Taken Time   /82 05/15/25 13:54   Temp 36.6 °C (97.9 °F) 05/15/25 13:54   Pulse 67 05/15/25 13:57   Resp 20 05/15/25 13:54   SpO2 97 % 05/15/25 13:57   Vitals shown include unfiled device data.        Post Anesthesia Care and Evaluation    Patient location during evaluation: bedside  Patient participation: complete - patient participated  Level of consciousness: awake  Pain management: adequate    Airway patency: patent  Anesthetic complications: No anesthetic complications  PONV Status: controlled  Cardiovascular status: acceptable and stable  Respiratory status: acceptable

## 2025-05-19 LAB
CYTO UR: NORMAL
LAB AP CASE REPORT: NORMAL
LAB AP CLINICAL INFORMATION: NORMAL
PATH REPORT.FINAL DX SPEC: NORMAL
PATH REPORT.GROSS SPEC: NORMAL

## 2025-05-20 ENCOUNTER — PREP FOR SURGERY (OUTPATIENT)
Dept: OTHER | Facility: HOSPITAL | Age: 70
End: 2025-05-20
Payer: MEDICARE

## 2025-05-20 ENCOUNTER — RESULTS FOLLOW-UP (OUTPATIENT)
Dept: GASTROENTEROLOGY | Facility: HOSPITAL | Age: 70
End: 2025-05-20
Payer: MEDICARE

## 2025-05-20 DIAGNOSIS — K63.5 JUVENILE POLYP OF COLON: ICD-10-CM

## 2025-05-20 DIAGNOSIS — Z12.11 ENCOUNTER FOR COLONOSCOPY FOLLOWING COLON POLYP REMOVAL: Primary | ICD-10-CM

## 2025-05-20 DIAGNOSIS — Z86.0100 ENCOUNTER FOR COLONOSCOPY FOLLOWING COLON POLYP REMOVAL: Primary | ICD-10-CM

## 2025-05-20 RX ORDER — SODIUM, POTASSIUM,MAG SULFATES 17.5-3.13G
2 SOLUTION, RECONSTITUTED, ORAL ORAL TAKE AS DIRECTED
Qty: 177 ML | Refills: 0 | Status: SHIPPED | OUTPATIENT
Start: 2025-05-20

## 2025-05-23 NOTE — TELEPHONE ENCOUNTER
Spoke to patient. Colonoscopy scheduled for 11/3/25. Arrival time is 6:30AM.  He is aware. Would like instructions sent via [employer].

## 2025-06-06 DIAGNOSIS — E78.5 HYPERLIPIDEMIA LDL GOAL <100: ICD-10-CM

## 2025-06-06 DIAGNOSIS — I10 PRIMARY HYPERTENSION: ICD-10-CM

## 2025-06-06 RX ORDER — OLMESARTAN MEDOXOMIL 40 MG/1
40 TABLET ORAL DAILY
Qty: 90 TABLET | Refills: 1 | OUTPATIENT
Start: 2025-06-06

## 2025-06-06 RX ORDER — AMLODIPINE BESYLATE 10 MG/1
10 TABLET ORAL DAILY
Qty: 90 TABLET | Refills: 1 | OUTPATIENT
Start: 2025-06-06

## 2025-06-06 RX ORDER — PRAVASTATIN SODIUM 10 MG
10 TABLET ORAL DAILY
Qty: 90 TABLET | Refills: 1 | OUTPATIENT
Start: 2025-06-06

## 2025-06-23 DIAGNOSIS — M10.9 GOUT, UNSPECIFIED CAUSE, UNSPECIFIED CHRONICITY, UNSPECIFIED SITE: ICD-10-CM

## 2025-06-23 RX ORDER — ALLOPURINOL 100 MG/1
100 TABLET ORAL DAILY
Qty: 90 TABLET | Refills: 1 | OUTPATIENT
Start: 2025-06-23

## 2025-06-25 ENCOUNTER — LAB (OUTPATIENT)
Dept: LAB | Facility: HOSPITAL | Age: 70
End: 2025-06-25
Payer: MEDICARE

## 2025-06-25 ENCOUNTER — OFFICE VISIT (OUTPATIENT)
Dept: FAMILY MEDICINE CLINIC | Age: 70
End: 2025-06-25
Payer: MEDICARE

## 2025-06-25 VITALS
WEIGHT: 248 LBS | OXYGEN SATURATION: 99 % | SYSTOLIC BLOOD PRESSURE: 153 MMHG | HEART RATE: 66 BPM | HEIGHT: 72 IN | TEMPERATURE: 98.2 F | BODY MASS INDEX: 33.59 KG/M2 | DIASTOLIC BLOOD PRESSURE: 81 MMHG

## 2025-06-25 DIAGNOSIS — K76.0 HEPATIC STEATOSIS: ICD-10-CM

## 2025-06-25 DIAGNOSIS — E78.2 MIXED HYPERLIPIDEMIA: ICD-10-CM

## 2025-06-25 DIAGNOSIS — I10 PRIMARY HYPERTENSION: ICD-10-CM

## 2025-06-25 DIAGNOSIS — F10.90 HEAVY ALCOHOL USE: ICD-10-CM

## 2025-06-25 DIAGNOSIS — M1A.09X0 IDIOPATHIC CHRONIC GOUT OF MULTIPLE SITES WITHOUT TOPHUS: ICD-10-CM

## 2025-06-25 DIAGNOSIS — E78.2 MIXED HYPERLIPIDEMIA: Primary | ICD-10-CM

## 2025-06-25 DIAGNOSIS — K21.9 GASTROESOPHAGEAL REFLUX DISEASE WITHOUT ESOPHAGITIS: ICD-10-CM

## 2025-06-25 DIAGNOSIS — R73.09 ELEVATED GLUCOSE: ICD-10-CM

## 2025-06-25 DIAGNOSIS — R35.1 NOCTURIA: ICD-10-CM

## 2025-06-25 DIAGNOSIS — Z12.5 PROSTATE CANCER SCREENING: ICD-10-CM

## 2025-06-25 DIAGNOSIS — Z11.59 ENCOUNTER FOR SCREENING FOR OTHER VIRAL DISEASES: ICD-10-CM

## 2025-06-25 PROBLEM — R13.10 DYSPHAGIA: Status: RESOLVED | Noted: 2025-04-25 | Resolved: 2025-06-25

## 2025-06-25 PROBLEM — M10.271 ACUTE DRUG-INDUCED GOUT OF RIGHT FOOT: Status: RESOLVED | Noted: 2023-10-24 | Resolved: 2025-06-25

## 2025-06-25 PROBLEM — Z12.11 ENCOUNTER FOR COLONOSCOPY FOLLOWING COLON POLYP REMOVAL: Status: RESOLVED | Noted: 2025-05-20 | Resolved: 2025-06-25

## 2025-06-25 PROBLEM — Z86.0100 ENCOUNTER FOR COLONOSCOPY FOLLOWING COLON POLYP REMOVAL: Status: RESOLVED | Noted: 2025-05-20 | Resolved: 2025-06-25

## 2025-06-25 PROBLEM — Z12.11 COLON CANCER SCREENING: Status: RESOLVED | Noted: 2025-04-25 | Resolved: 2025-06-25

## 2025-06-25 PROBLEM — R74.01 TRANSAMINITIS: Status: RESOLVED | Noted: 2024-06-21 | Resolved: 2025-06-25

## 2025-06-25 PROBLEM — E66.9 OBESITY (BMI 30-39.9): Status: RESOLVED | Noted: 2024-06-21 | Resolved: 2025-06-25

## 2025-06-25 PROBLEM — E78.5 HYPERLIPIDEMIA LDL GOAL <100: Status: RESOLVED | Noted: 2023-10-24 | Resolved: 2025-06-25

## 2025-06-25 PROBLEM — R16.0 HEPATOMEGALY: Status: ACTIVE | Noted: 2025-06-25

## 2025-06-25 LAB
ALBUMIN SERPL-MCNC: 4.2 G/DL (ref 3.5–5.2)
ALBUMIN/GLOB SERPL: 1.5 G/DL
ALP SERPL-CCNC: 70 U/L (ref 39–117)
ALT SERPL W P-5'-P-CCNC: 43 U/L (ref 1–41)
ANION GAP SERPL CALCULATED.3IONS-SCNC: 11.9 MMOL/L (ref 5–15)
AST SERPL-CCNC: 45 U/L (ref 1–40)
BASOPHILS # BLD AUTO: 0.03 10*3/MM3 (ref 0–0.2)
BASOPHILS NFR BLD AUTO: 0.5 % (ref 0–1.5)
BILIRUB SERPL-MCNC: 1 MG/DL (ref 0–1.2)
BUN SERPL-MCNC: 10 MG/DL (ref 8–23)
BUN/CREAT SERPL: 8.9 (ref 7–25)
CALCIUM SPEC-SCNC: 9.4 MG/DL (ref 8.6–10.5)
CHLORIDE SERPL-SCNC: 106 MMOL/L (ref 98–107)
CHOLEST SERPL-MCNC: 194 MG/DL (ref 0–200)
CO2 SERPL-SCNC: 24.1 MMOL/L (ref 22–29)
CREAT SERPL-MCNC: 1.12 MG/DL (ref 0.76–1.27)
DEPRECATED RDW RBC AUTO: 41.8 FL (ref 37–54)
EGFRCR SERPLBLD CKD-EPI 2021: 71.1 ML/MIN/1.73
EOSINOPHIL # BLD AUTO: 0.19 10*3/MM3 (ref 0–0.4)
EOSINOPHIL NFR BLD AUTO: 3.2 % (ref 0.3–6.2)
ERYTHROCYTE [DISTWIDTH] IN BLOOD BY AUTOMATED COUNT: 11.4 % (ref 12.3–15.4)
GLOBULIN UR ELPH-MCNC: 2.8 GM/DL
GLUCOSE SERPL-MCNC: 93 MG/DL (ref 65–99)
HAV IGM SERPL QL IA: NORMAL
HBA1C MFR BLD: 5.2 % (ref 4.8–5.6)
HBV CORE IGM SERPL QL IA: NORMAL
HBV SURFACE AG SERPL QL IA: NORMAL
HCT VFR BLD AUTO: 45.4 % (ref 37.5–51)
HCV AB SER QL: NORMAL
HDLC SERPL-MCNC: 45 MG/DL (ref 40–60)
HGB BLD-MCNC: 15.6 G/DL (ref 13–17.7)
IMM GRANULOCYTES # BLD AUTO: 0.01 10*3/MM3 (ref 0–0.05)
IMM GRANULOCYTES NFR BLD AUTO: 0.2 % (ref 0–0.5)
LDLC SERPL CALC-MCNC: 116 MG/DL (ref 0–100)
LDLC/HDLC SERPL: 2.49 {RATIO}
LYMPHOCYTES # BLD AUTO: 1.96 10*3/MM3 (ref 0.7–3.1)
LYMPHOCYTES NFR BLD AUTO: 33.5 % (ref 19.6–45.3)
MCH RBC QN AUTO: 33.3 PG (ref 26.6–33)
MCHC RBC AUTO-ENTMCNC: 34.4 G/DL (ref 31.5–35.7)
MCV RBC AUTO: 97 FL (ref 79–97)
MONOCYTES # BLD AUTO: 0.53 10*3/MM3 (ref 0.1–0.9)
MONOCYTES NFR BLD AUTO: 9.1 % (ref 5–12)
NEUTROPHILS NFR BLD AUTO: 3.13 10*3/MM3 (ref 1.7–7)
NEUTROPHILS NFR BLD AUTO: 53.5 % (ref 42.7–76)
PLATELET # BLD AUTO: 86 10*3/MM3 (ref 140–450)
PMV BLD AUTO: 10.1 FL (ref 6–12)
POTASSIUM SERPL-SCNC: 3.7 MMOL/L (ref 3.5–5.2)
PROT SERPL-MCNC: 7 G/DL (ref 6–8.5)
PSA SERPL-MCNC: 0.83 NG/ML (ref 0–4)
RBC # BLD AUTO: 4.68 10*6/MM3 (ref 4.14–5.8)
SODIUM SERPL-SCNC: 142 MMOL/L (ref 136–145)
TRIGL SERPL-MCNC: 185 MG/DL (ref 0–150)
URATE SERPL-MCNC: 6.5 MG/DL (ref 3.4–7)
VLDLC SERPL-MCNC: 33 MG/DL (ref 5–40)
WBC NRBC COR # BLD AUTO: 5.85 10*3/MM3 (ref 3.4–10.8)

## 2025-06-25 PROCEDURE — 85025 COMPLETE CBC W/AUTO DIFF WBC: CPT

## 2025-06-25 PROCEDURE — 36415 COLL VENOUS BLD VENIPUNCTURE: CPT

## 2025-06-25 PROCEDURE — 80053 COMPREHEN METABOLIC PANEL: CPT

## 2025-06-25 PROCEDURE — 3044F HG A1C LEVEL LT 7.0%: CPT | Performed by: NURSE PRACTITIONER

## 2025-06-25 PROCEDURE — 1159F MED LIST DOCD IN RCRD: CPT | Performed by: NURSE PRACTITIONER

## 2025-06-25 PROCEDURE — 84550 ASSAY OF BLOOD/URIC ACID: CPT

## 2025-06-25 PROCEDURE — 83036 HEMOGLOBIN GLYCOSYLATED A1C: CPT

## 2025-06-25 PROCEDURE — 80074 ACUTE HEPATITIS PANEL: CPT

## 2025-06-25 PROCEDURE — G0103 PSA SCREENING: HCPCS

## 2025-06-25 PROCEDURE — 99214 OFFICE O/P EST MOD 30 MIN: CPT | Performed by: NURSE PRACTITIONER

## 2025-06-25 PROCEDURE — 3079F DIAST BP 80-89 MM HG: CPT | Performed by: NURSE PRACTITIONER

## 2025-06-25 PROCEDURE — 1160F RVW MEDS BY RX/DR IN RCRD: CPT | Performed by: NURSE PRACTITIONER

## 2025-06-25 PROCEDURE — 80061 LIPID PANEL: CPT

## 2025-06-25 PROCEDURE — 1126F AMNT PAIN NOTED NONE PRSNT: CPT | Performed by: NURSE PRACTITIONER

## 2025-06-25 PROCEDURE — 3077F SYST BP >= 140 MM HG: CPT | Performed by: NURSE PRACTITIONER

## 2025-06-25 RX ORDER — OLMESARTAN MEDOXOMIL 40 MG/1
TABLET ORAL
Qty: 30 TABLET | OUTPATIENT
Start: 2025-06-25

## 2025-06-25 RX ORDER — COLCHICINE 0.6 MG/1
TABLET ORAL
Qty: 30 TABLET | Refills: 1 | Status: SHIPPED | OUTPATIENT
Start: 2025-06-25

## 2025-06-25 RX ORDER — TERAZOSIN 1 MG/1
1 CAPSULE ORAL NIGHTLY
Qty: 90 CAPSULE | Refills: 1 | Status: SHIPPED | OUTPATIENT
Start: 2025-06-25

## 2025-06-25 RX ORDER — OLMESARTAN MEDOXOMIL 40 MG/1
40 TABLET ORAL DAILY
Qty: 90 TABLET | Refills: 1 | Status: SHIPPED | OUTPATIENT
Start: 2025-06-25

## 2025-06-25 NOTE — ASSESSMENT & PLAN NOTE
Continue benicar 40mg daily. Starting hytrin for nocturia and HTN. Potential side effects of medication discussed.    Low-sodium diet.  Routine exercise and weight loss. Monitor BP at home. Goal less than 130/80.   Orders:    CBC Auto Differential; Future    olmesartan (BENICAR) 40 MG tablet; Take 1 tablet by mouth Daily.    CBC Auto Differential; Future    Lipid Panel; Future    Comprehensive Metabolic Panel; Future

## 2025-06-25 NOTE — ASSESSMENT & PLAN NOTE
Stable on pravastatin 20mg daily.  Heart healthy diet. Routine exercise and weight loss. Continue to monitor.    Orders:    Lipid Panel; Future

## 2025-06-25 NOTE — PROGRESS NOTES
"Chief Complaint  Establish Care and Ear Fullness    Subjective          Blair Rodriguez presents to [facility] wishing to establish care.  Patient was previously seeing DARREL Garcia.    Patient had stopped taking amlodipine, as he reports it made his whole body burn.  He is currently only taking olmesartan 40 mg daily for his hypertension.  Blood pressure is elevated today.    Pt does report nocturia. Not currently being treated for BPH.     Patient is taking pravastatin 10 mg daily for hyperlipidemia.  Patient takes 81 mg aspirin daily.    Has history of gout and is currently on allopurinol 100 mg daily.  Last flare was so long ago, he can not recall.    GERD treated with Pepcid 40 mg daily. Pt was sent to gastro for dysphagia. Had an EGD and colonoscopy on 5/15/25. 18mm polyp and diverticulosis noted. Repeating in November. EGD noted small amount of esophagitis and small hiatal hernia.  Recent ultrasound showed hepatomegaly and hepatic steatosis.    He has Cialis 5mg po daily he got from a [facility] clinic. He was on higher dose of similar med in the past and it had made him felt dizzy, flushed and \"just weird\".     Patient takes a multivitamin daily.    Patient reports bilateral ear fullness.  Reports needing irrigations in the past.    Pt drinks 6 drinks daily since he was a teenager.     Former smoker. 15 pack years. Currently still chews. Has been to the dentist recently.     Medical, surgical, family and social history reviewed and updated in chart.         Objective   Vital Signs:   Vitals:    06/25/25 1331   BP: 153/81   Pulse: 66   Temp: 98.2 °F (36.8 °C)   TempSrc: Oral   SpO2: 99%   Weight: 112 kg (248 lb)   Height: 182.9 cm (72.01\")       Body mass index is 33.63 kg/m².          Physical Exam  Vitals reviewed.   Constitutional:       General: He is not in acute distress.     Appearance: He is well-developed.   HENT:      Head: Normocephalic and atraumatic.   Eyes:      " Conjunctiva/sclera: Conjunctivae normal.      Pupils: Pupils are equal, round, and reactive to light.   Neck:      Vascular: No carotid bruit.   Cardiovascular:      Rate and Rhythm: Normal rate and regular rhythm.      Heart sounds: Normal heart sounds. No murmur heard.  Pulmonary:      Effort: Pulmonary effort is normal. No respiratory distress.      Breath sounds: Normal breath sounds.   Skin:     General: Skin is warm and dry.   Neurological:      Mental Status: He is alert and oriented to person, place, and time.   Psychiatric:         Mood and Affect: Mood and affect normal.         Behavior: Behavior normal.         Thought Content: Thought content normal.         Judgment: Judgment normal.                        Assessment and Plan    Assessment & Plan  Mixed hyperlipidemia   Stable on pravastatin 20mg daily.  Heart healthy diet. Routine exercise and weight loss. Continue to monitor.    Orders:    Lipid Panel; Future    Primary hypertension   Continue benicar 40mg daily. Starting hytrin for nocturia and HTN. Potential side effects of medication discussed.    Low-sodium diet.  Routine exercise and weight loss. Monitor BP at home. Goal less than 130/80.   Orders:    CBC Auto Differential; Future    olmesartan (BENICAR) 40 MG tablet; Take 1 tablet by mouth Daily.    CBC Auto Differential; Future    Lipid Panel; Future    Comprehensive Metabolic Panel; Future    Gastroesophageal reflux disease without esophagitis  Stable on pepcid 40mg daily.  Continue to monitor.        Idiopathic chronic gout of multiple sites without tophus  Stable on hytrin 1mg daily.  Continue to monitor. Colchine as needed.   Orders:    Uric Acid; Future    Hepatic steatosis  Referral initiated. Cutting back on etoh. Avoid excessive tylenol.   Orders:    Ambulatory Referral to Gastroenterology    Comprehensive Metabolic Panel; Future    Prostate cancer screening  Will notify patient of results and treat accordingly.    Orders:    PSA  Screen; Future    Elevated glucose  Will notify patient of results and treat accordingly.     Orders:    Hemoglobin A1c; Future    Encounter for screening for other viral diseases  Will notify patient of results and treat accordingly.     Orders:    Hepatitis panel, acute; Future    Heavy alcohol use  Cutting back slowly to avoid withdrawal symptoms.        Nocturia  Starting hytrin. Hopefully this will help with both nocturia and HTN.        Other orders    terazosin (HYTRIN) 1 MG capsule; Take 1 capsule by mouth Every Night.    colchicine 0.6 MG tablet; Take 2 tablets by mouth and follow with 1 tablet by mouth 1 hour later. May continue to take 1 tab by mouth daily until flare has cleared.        Patient to notify office with any acute concerns or issues.  Patient verbalizes understanding, agrees with plan of care and has no further questions upon discharge.    Please note that portions of this note were completed with a voice recognition program.      Follow Up    Return in about 3 months (around 9/25/2025) for Recheck.  Patient was given instructions and counseling regarding his condition or for health maintenance advice. Please see specific information pulled into the AVS if appropriate.       Current Outpatient Medications:     allopurinol (ZYLOPRIM) 100 MG tablet, Take 1 tablet by mouth Daily., Disp: 90 tablet, Rfl: 1    famotidine (PEPCID) 40 MG tablet, Take 1 tablet by mouth Daily. Disregard the one with refills please., Disp: 30 tablet, Rfl: 0    multivitamin with minerals (One-A-Day Proactive 65+) tablet tablet, Take 1 tablet by mouth Daily., Disp: , Rfl:     Nutritional Supplements (JOINT FORMULA PO), Take  by mouth Daily., Disp: , Rfl:     olmesartan (BENICAR) 40 MG tablet, Take 1 tablet by mouth Daily., Disp: 90 tablet, Rfl: 1    sodium-potassium-magnesium sulfates (Suprep Bowel Prep Kit) 17.5-3.13-1.6 GM/177ML solution oral solution, Take 2 bottles by mouth Take As Directed., Disp: 177 mL, Rfl: 0     triamcinolone (KENALOG) 0.1 % cream, Apply 1 Application topically to the appropriate area as directed 2 (Two) Times a Day. (Patient taking differently: Apply 1 Application topically to the appropriate area as directed 2 (Two) Times a Day As Needed.), Disp: 30 g, Rfl: 0    colchicine 0.6 MG tablet, Take 2 tablets by mouth and follow with 1 tablet by mouth 1 hour later. May continue to take 1 tab by mouth daily until flare has cleared., Disp: 30 tablet, Rfl: 1    pravastatin (PRAVACHOL) 20 MG tablet, Take 1 tablet by mouth Every Night., Disp: 90 tablet, Rfl: 1    terazosin (HYTRIN) 1 MG capsule, Take 1 capsule by mouth Every Night., Disp: 90 capsule, Rfl: 1  Medications Discontinued During This Encounter   Medication Reason    amLODIPine (NORVASC) 10 MG tablet Historical Med - Therapy completed    aspirin 81 MG EC tablet Historical Med - Therapy completed    olmesartan (BENICAR) 40 MG tablet Reorder    tadalafil (CIALIS) 5 MG tablet Historical Med - Therapy completed

## 2025-06-25 NOTE — ASSESSMENT & PLAN NOTE
Stable on hytrin 1mg daily.  Continue to monitor. Colchine as needed.   Orders:    Uric Acid; Future

## 2025-06-25 NOTE — ASSESSMENT & PLAN NOTE
Referral initiated. Cutting back on etoh. Avoid excessive tylenol.   Orders:    Ambulatory Referral to Gastroenterology    Comprehensive Metabolic Panel; Future

## 2025-06-26 ENCOUNTER — PATIENT ROUNDING (BHMG ONLY) (OUTPATIENT)
Dept: FAMILY MEDICINE CLINIC | Age: 70
End: 2025-06-26
Payer: MEDICARE

## 2025-06-26 NOTE — PROGRESS NOTES
I am calling to officially welcome you to our practice and ask about your recent visit. Is this a good time to talk?  YES  Tell me about your visit with us. What things went well?  very pleased with new provider       We're always looking for ways to make our patients' experiences even better. Do you have recommendations on ways we may improve?  NO    Overall were you satisfied with your first visit to our practice? YES       I appreciate you taking the time to speak with me today. Is there anything else I can do for you? NO      Thank you, and have a great day.

## 2025-07-14 PROBLEM — R35.1 NOCTURIA: Status: ACTIVE | Noted: 2025-07-14

## 2025-07-24 ENCOUNTER — TELEPHONE (OUTPATIENT)
Dept: GASTROENTEROLOGY | Facility: CLINIC | Age: 70
End: 2025-07-24
Payer: MEDICARE

## 2025-07-24 NOTE — TELEPHONE ENCOUNTER
Caller: KATRIN SRIVASTAVA    Relationship to patient: SELF    Best call back number: [phone]    Chief complaint: PT NEEDS TO R/S COLONOSCOPY     Type of visit: COLONOSCOPY     Requested date: MAYBE IN OCTOBER    If rescheduling, when is the original appointment: 11.03     Additional notes: AFTER THE 3RD, IN NOVEMBER PT IS AVAILABLE

## 2025-07-25 DIAGNOSIS — M10.9 GOUT, UNSPECIFIED CAUSE, UNSPECIFIED CHRONICITY, UNSPECIFIED SITE: ICD-10-CM

## 2025-07-25 DIAGNOSIS — E78.5 HYPERLIPIDEMIA LDL GOAL <100: ICD-10-CM

## 2025-07-25 PROBLEM — Z86.0100 ENCOUNTER FOR COLONOSCOPY FOLLOWING COLON POLYP REMOVAL: Status: ACTIVE | Noted: 2025-05-20

## 2025-07-25 PROBLEM — Z12.11 ENCOUNTER FOR COLONOSCOPY FOLLOWING COLON POLYP REMOVAL: Status: ACTIVE | Noted: 2025-05-20

## 2025-07-25 RX ORDER — PRAVASTATIN SODIUM 10 MG
10 TABLET ORAL DAILY
Qty: 30 TABLET | Refills: 0 | OUTPATIENT
Start: 2025-07-25

## 2025-07-27 RX ORDER — ALLOPURINOL 100 MG/1
100 TABLET ORAL DAILY
Qty: 90 TABLET | Refills: 1 | Status: SHIPPED | OUTPATIENT
Start: 2025-07-27

## (undated) DEVICE — Device

## (undated) DEVICE — THE STERILE LIGHT HANDLE COVER IS USED WITH STERIS SURGICAL LIGHTING AND VISUALIZATION SYSTEMS.

## (undated) DEVICE — BLCK/BITE BLOX WO/DENTL/RIM W/STRAP 54F

## (undated) DEVICE — DEFENDO AIR WATER SUCTION AND BIOPSY VALVE KIT FOR  OLYMPUS: Brand: DEFENDO AIR/WATER/SUCTION AND BIOPSY VALVE

## (undated) DEVICE — Device: Brand: SINGLE USE INJECTOR NM600/610

## (undated) DEVICE — CONN JET HYDRA H20 AUXILIARY DISP

## (undated) DEVICE — PAD GRND REM POLYHESIVE A/ DISP

## (undated) DEVICE — Device: Brand: DISPOSABLE ELECTROSURGICAL SNARE

## (undated) DEVICE — THE SINGLE USE ETRAP – POLYP TRAP IS USED FOR SUCTION RETRIEVAL OF ENDOSCOPICALLY REMOVED POLYPS.: Brand: ETRAP

## (undated) DEVICE — SNAR POLYP CAPTIFLEX XS/OVL 11X2.4MM 240CM 1P/U

## (undated) DEVICE — SOL IRRG H2O PL/BG 1000ML STRL

## (undated) DEVICE — LINER SURG CANSTR SXN S/RIGD 1500CC

## (undated) DEVICE — SINGLE-USE BIOPSY FORCEPS: Brand: RADIAL JAW 4

## (undated) DEVICE — Device: Brand: SPOT EX ENDOSCOPIC TATTOO

## (undated) DEVICE — SOLIDIFIER LIQLOC PLS 1500CC BT